# Patient Record
Sex: MALE | Race: WHITE | Employment: OTHER | URBAN - METROPOLITAN AREA
[De-identification: names, ages, dates, MRNs, and addresses within clinical notes are randomized per-mention and may not be internally consistent; named-entity substitution may affect disease eponyms.]

---

## 2019-03-07 ENCOUNTER — TELEPHONE (OUTPATIENT)
Dept: FAMILY MEDICINE | Facility: CLINIC | Age: 54
End: 2019-03-07

## 2019-03-07 ENCOUNTER — OFFICE VISIT (OUTPATIENT)
Dept: FAMILY MEDICINE | Facility: CLINIC | Age: 54
End: 2019-03-07
Payer: MEDICAID

## 2019-03-07 VITALS — SYSTOLIC BLOOD PRESSURE: 158 MMHG | DIASTOLIC BLOOD PRESSURE: 100 MMHG | WEIGHT: 237 LBS

## 2019-03-07 DIAGNOSIS — S46.211S TEAR OF RIGHT BICEPS MUSCLE, SEQUELA: ICD-10-CM

## 2019-03-07 DIAGNOSIS — Z79.4 TYPE 2 DIABETES MELLITUS WITH HYPERGLYCEMIA, WITH LONG-TERM CURRENT USE OF INSULIN (H): Primary | ICD-10-CM

## 2019-03-07 DIAGNOSIS — E66.811 CLASS 1 OBESITY WITH SERIOUS COMORBIDITY IN ADULT, UNSPECIFIED BMI, UNSPECIFIED OBESITY TYPE: ICD-10-CM

## 2019-03-07 DIAGNOSIS — E11.65 TYPE 2 DIABETES MELLITUS WITH HYPERGLYCEMIA, WITH LONG-TERM CURRENT USE OF INSULIN (H): Primary | ICD-10-CM

## 2019-03-07 DIAGNOSIS — E78.5 DYSLIPIDEMIA: ICD-10-CM

## 2019-03-07 DIAGNOSIS — I10 ESSENTIAL HYPERTENSION: ICD-10-CM

## 2019-03-07 DIAGNOSIS — R80.9 POSITIVE FOR MICROALBUMINURIA: ICD-10-CM

## 2019-03-07 PROBLEM — E11.9 DIABETES MELLITUS, TYPE 2 (H): Status: ACTIVE | Noted: 2019-03-07

## 2019-03-07 LAB
CHOLEST SERPL-MCNC: 244 MG/DL
CREAT SERPL-MCNC: 0.77 MG/DL (ref 0.66–1.25)
CREAT UR-MCNC: 167 MG/DL
GFR SERPL CREATININE-BSD FRML MDRD: >90 ML/MIN/{1.73_M2}
HBA1C MFR BLD: 7.9 % (ref 0–5.6)
HDLC SERPL-MCNC: 48 MG/DL
LDLC SERPL CALC-MCNC: 178 MG/DL
MICROALBUMIN UR-MCNC: 119 MG/L
MICROALBUMIN/CREAT UR: 71.26 MG/G CR (ref 0–17)
NONHDLC SERPL-MCNC: 196 MG/DL
TRIGL SERPL-MCNC: 91 MG/DL
TSH SERPL DL<=0.005 MIU/L-ACNC: 1.91 MU/L (ref 0.4–4)

## 2019-03-07 PROCEDURE — 99203 OFFICE O/P NEW LOW 30 MIN: CPT | Performed by: PHYSICIAN ASSISTANT

## 2019-03-07 PROCEDURE — 83036 HEMOGLOBIN GLYCOSYLATED A1C: CPT | Performed by: PHYSICIAN ASSISTANT

## 2019-03-07 PROCEDURE — 82565 ASSAY OF CREATININE: CPT | Performed by: PHYSICIAN ASSISTANT

## 2019-03-07 PROCEDURE — 80061 LIPID PANEL: CPT | Performed by: PHYSICIAN ASSISTANT

## 2019-03-07 PROCEDURE — 99207 C FOOT EXAM  NO CHARGE: CPT | Performed by: PHYSICIAN ASSISTANT

## 2019-03-07 PROCEDURE — 82043 UR ALBUMIN QUANTITATIVE: CPT | Performed by: PHYSICIAN ASSISTANT

## 2019-03-07 PROCEDURE — 84443 ASSAY THYROID STIM HORMONE: CPT | Performed by: PHYSICIAN ASSISTANT

## 2019-03-07 PROCEDURE — 36415 COLL VENOUS BLD VENIPUNCTURE: CPT | Performed by: PHYSICIAN ASSISTANT

## 2019-03-07 RX ORDER — AMLODIPINE BESYLATE 5 MG/1
5 TABLET ORAL DAILY
Qty: 30 TABLET | Refills: 0 | Status: SHIPPED | OUTPATIENT
Start: 2019-03-07 | End: 2019-09-30

## 2019-03-07 RX ORDER — ATORVASTATIN CALCIUM 20 MG/1
20 TABLET, FILM COATED ORAL DAILY
Qty: 30 TABLET | Refills: 11 | Status: SHIPPED | OUTPATIENT
Start: 2019-03-07

## 2019-03-07 RX ORDER — LANCETS
EACH MISCELLANEOUS
Qty: 100 EACH | Refills: 11 | Status: SHIPPED | OUTPATIENT
Start: 2019-03-07

## 2019-03-07 RX ORDER — GLUCOSAMINE HCL/CHONDROITIN SU 500-400 MG
CAPSULE ORAL
Qty: 100 EACH | Refills: 3 | Status: SHIPPED | OUTPATIENT
Start: 2019-03-07

## 2019-03-07 SDOH — HEALTH STABILITY: MENTAL HEALTH: HOW OFTEN DO YOU HAVE A DRINK CONTAINING ALCOHOL?: NEVER

## 2019-03-07 NOTE — LETTER
March 8, 2019      Prieto Guzmán  [address] [place] [address]        Dear ,    We are writing to inform you of your test results.    Repeat MAC in 3 mo.    Resulted Orders   Hemoglobin A1c   Result Value Ref Range    Hemoglobin A1C 7.9 (H) 0 - 5.6 %      Comment:      Normal <5.7% Prediabetes 5.7-6.4%  Diabetes 6.5% or higher - adopted from ADA   consensus guidelines.     Lipid panel reflex to direct LDL Non-fasting   Result Value Ref Range    Cholesterol 244 (H) <200 mg/dL      Comment:      Desirable:       <200 mg/dl    Triglycerides 91 <150 mg/dL      Comment:      Non Fasting    HDL Cholesterol 48 >39 mg/dL    LDL Cholesterol Calculated 178 (H) <100 mg/dL      Comment:      Above desirable:  100-129 mg/dl  Borderline High:  130-159 mg/dL  High:             160-189 mg/dL  Very high:       >189 mg/dl      Non HDL Cholesterol 196 (H) <130 mg/dL      Comment:      Above Desirable:  130-159 mg/dl  Borderline high:  160-189 mg/dl  High:             190-219 mg/dl  Very high:       >219 mg/dl     Creatinine   Result Value Ref Range    Creatinine 0.77 0.66 - 1.25 mg/dL    GFR Estimate >90 >60 mL/min/[1.73_m2]      Comment:      Non  GFR Calc  Starting 12/18/2018, serum creatinine based estimated GFR (eGFR) will be   calculated using the Chronic Kidney Disease Epidemiology Collaboration   (CKD-EPI) equation.      GFR Estimate If Black >90 >60 mL/min/[1.73_m2]      Comment:       GFR Calc  Starting 12/18/2018, serum creatinine based estimated GFR (eGFR) will be   calculated using the Chronic Kidney Disease Epidemiology Collaboration   (CKD-EPI) equation.     TSH with free T4 reflex   Result Value Ref Range    TSH 1.91 0.40 - 4.00 mU/L   Albumin Random Urine Quantitative with Creat Ratio   Result Value Ref Range    Creatinine Urine 167 mg/dL    Albumin Urine mg/L 119 mg/L    Albumin Urine mg/g Cr 71.26 (H) 0 - 17 mg/g Cr       If you have any questions or  concerns, please call the clinic at the number listed above.       Sincerely,        Izzy Gao PA-C

## 2019-03-07 NOTE — PATIENT INSTRUCTIONS
Diabetes -  a1c 7.9 - ideally target under 7 since newer diagnosis   Slight increase levemir  Do set up diabetic educator appt   Avoid alcohol until sugars are fully controlled  Targets: morning before eating   2 hrs after eating - under 180   Don't want ever under 70  Eventually eye doctor     BP -  Amlodipine 5 mg  If finding BPs tend to be at/above 130/80, can increase to amlodipine 10 mg   Can call me with updates for further med adjustments     Cough - make another appt     Disability hunting permit

## 2019-03-07 NOTE — NURSING NOTE
Chief Complaint   Patient presents with     Diabetes     Hypertension       Initial BP (!) 168/102 (BP Location: Right arm, Patient Position: Chair, Cuff Size: Adult Large)   Wt 107.5 kg (237 lb)  There is no height or weight on file to calculate BMI.    Patient presents to the clinic using No DME    Health Maintenance that is potentially due pending provider review:  NONE        Is there anyone who you would like to be able to receive your results? No  If yes have patient fill out RAYSA

## 2019-03-07 NOTE — TELEPHONE ENCOUNTER
Unable to leave a message at this time.  I did talk with him earlier and he said he would call me back  Need this from him when he calls back:      insulin isophane human (HUMULIN N PEN) 100 UNIT/ML injection 3 mL 1 3/7/2019  --   Sig: With sliding scale.     Need to know the specific sliding scale details  Cheryl Luke RN
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
## 2019-03-08 ENCOUNTER — TELEPHONE (OUTPATIENT)
Dept: FAMILY MEDICINE | Facility: CLINIC | Age: 54
End: 2019-03-08

## 2019-03-08 ENCOUNTER — OFFICE VISIT (OUTPATIENT)
Dept: FAMILY MEDICINE | Facility: CLINIC | Age: 54
End: 2019-03-08
Payer: MEDICAID

## 2019-03-08 ENCOUNTER — PATIENT OUTREACH (OUTPATIENT)
Dept: CARE COORDINATION | Facility: CLINIC | Age: 54
End: 2019-03-08

## 2019-03-08 VITALS
WEIGHT: 242.2 LBS | OXYGEN SATURATION: 97 % | BODY MASS INDEX: 33.91 KG/M2 | HEART RATE: 81 BPM | DIASTOLIC BLOOD PRESSURE: 80 MMHG | SYSTOLIC BLOOD PRESSURE: 126 MMHG | RESPIRATION RATE: 16 BRPM | TEMPERATURE: 98.1 F | HEIGHT: 71 IN

## 2019-03-08 DIAGNOSIS — I10 ESSENTIAL HYPERTENSION: ICD-10-CM

## 2019-03-08 DIAGNOSIS — R05.8 PRODUCTIVE COUGH: ICD-10-CM

## 2019-03-08 DIAGNOSIS — R80.9 MICROALBUMINURIA: ICD-10-CM

## 2019-03-08 DIAGNOSIS — Z79.4 TYPE 2 DIABETES MELLITUS WITH HYPERGLYCEMIA, WITH LONG-TERM CURRENT USE OF INSULIN (H): ICD-10-CM

## 2019-03-08 DIAGNOSIS — E11.65 TYPE 2 DIABETES MELLITUS WITH HYPERGLYCEMIA, WITH LONG-TERM CURRENT USE OF INSULIN (H): ICD-10-CM

## 2019-03-08 DIAGNOSIS — J01.90 ACUTE SINUSITIS WITH SYMPTOMS > 10 DAYS: Primary | ICD-10-CM

## 2019-03-08 PROCEDURE — 99213 OFFICE O/P EST LOW 20 MIN: CPT | Performed by: PHYSICIAN ASSISTANT

## 2019-03-08 RX ORDER — AMOXICILLIN 875 MG
875 TABLET ORAL 2 TIMES DAILY
Qty: 20 TABLET | Refills: 0 | Status: SHIPPED | OUTPATIENT
Start: 2019-03-08 | End: 2019-03-18

## 2019-03-08 ASSESSMENT — MIFFLIN-ST. JEOR: SCORE: 1960.74
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
## 2019-03-08 NOTE — LETTER
[facility] CARE COORDINATION - [place] [id number] [place] [address]  [phone]    March 11, 2019    Prieto Guzmán  [address] [place] [address]      Dear Prieto,    I am a clinic care coordinator who works at [facility]. I have been trying to reach you recently to introduce Clinic Care Coordination and to see if there was anything I could assist you with.  I wanted to introduce myself and provide you with my contact information so that you can call me with questions or concerns about your health care. Below is a description of clinic care coordination and how I can further assist you.     The clinic care coordinator is a registered nurse and/or  who understand the health care system. The goal of clinic care coordination is to help you manage your health and improve access to the [facility] system in the most efficient manner. The registered nurse can assist you in meeting your health care goals by providing education, coordinating services, and strengthening the communication among your providers. The  can assist you with financial, behavioral, psychosocial, chemical dependency, counseling, and/or psychiatric resources.    Please feel free to contact me at [phone], with any questions or concerns. We at [facility] are focused on providing you with the highest-quality healthcare experience possible and that all starts with you.     Sincerely,     Aminata Caputo, [facility]  Clinic Care Coordination  [phone]      Enclosed: I have enclosed a copy of a 24 Hour Access Plan. This has helpful phone numbers for you to call when needed. Please keep this in an easy to access place to use as needed.

## 2019-03-08 NOTE — PROGRESS NOTES
Clinic Care Coordination Contact  [facility]/Voicemail    Referral Source: PCP  Clinical Data: Care Coordinator Outreach  Outreach attempted x 1, no voicemail box set up and unable to leave a message.    Plan: Care Coordinator will wait to send letter address in the system is for [facility] in the patient appears to be visiting closer to [place]. Care Coordinator will try to reach patient again in 1-2 business days.  TOPHER Garcia, Clinic Care Coordinator 3/8/2019   12:22 PM  [phone]

## 2019-03-08 NOTE — TELEPHONE ENCOUNTER
When discussing what insulin the patinet is using, he said he is on the Humalin R not N and he is using it twice daily.  Since he does not have insurance there is a novolin R that we can dispense for $25.     Can we get a new order?    Dru Emmanuel, Pharm D  [facility]  [phone]

## 2019-03-08 NOTE — PATIENT INSTRUCTIONS
Decided didn't want augmentin or doxycycline, so treating with amoxicillin    Be seen if not resolving within a week after the antibiotics     congrats on BP    Follow up 3 months for diabetes     Call if questions

## 2019-03-08 NOTE — LETTER
Health Care Home - Access Care Plan    About Me  Patient Name:  Prieto Casillas    YOB: 1965  Age:                             53 year old   Kayleen MRN:            [id number] Telephone Information:   Home Phone [phone]   Mobile Not on file.       Address:    [address] [place] [address] Email address:  No e-mail address on record      Emergency Contact(s)  Name Relationship Lgl Grd Work Phone Home Phone Mobile Phone   1Tereza CASILLAS,JU* Son   [phone]              Health Maintenance:      My Access Plan  Medical Emergency 911   Questions or concerns during clinic hours Primary Clinic Line, I will call the clinic directly: [facility] - [phone]   24 Hour Appointment Line [phone] or  [phone]) (toll free)   24 Hour Nurse Line [phone] (toll free)   Questions or concerns outside clinic hours 24 Hour Appointment Line, I will call the after-hours on-call line:   [facility] [phone] or [phone]) (toll-free)   Preferred Urgent Care     Preferred Hospital     Preferred Pharmacy No Pharmacies Listed   Behavioral Health Crisis Line The National Suicide Prevention Lifeline at 1-362.238.7507 or 914     My Care Team Members  Patient Care Team       Relationship Specialty Notifications Start End    No Ref-Primary, Physician PCP - General   3/7/19     Fax: [phone]         Aminata Caputo LSW Clinic Care Coordinator Primary Care - CC Admissions 3/8/19     Phone: [phone] Fax: [phone]               My Medical and Care Information  Problem List   Patient Active Problem List   Diagnosis     Diabetes mellitus, type 2 (H)     Tear of right biceps muscle, sequela     Positive for microalbuminuria     Essential hypertension      Current Medications and Allergies:  See printed Medication Report

## 2019-03-11 NOTE — PROGRESS NOTES
Clinic Care Coordination Contact  [facility]/Voicemail    Referral Source: PCP  Clinical Data: Care Coordinator Outreach  Outreach attempted x 2, no voice mail box.    Plan: Care Coordinator will mail out care coordination introduction letter with care coordinator contact information and explanation of care coordination services. Care Coordinator will do no further outreaches at this time.  TOPHER Garcia, Clinic Care Coordinator 3/11/2019   9:43 AM  [phone]

## 2019-03-19 ENCOUNTER — TELEPHONE (OUTPATIENT)
Dept: FAMILY MEDICINE | Facility: CLINIC | Age: 54
End: 2019-03-19

## 2019-03-19 NOTE — TELEPHONE ENCOUNTER
S-(situation): states was sitting and checking blood sugar and found himself on the floor.  Thinks on the floor for 5 minutes.   Got up, did not injure self.  Feels fine now.  Blood sugars in the 200's    B-(background): diabetic. On amoxicillin for sinus infection.  Seen on 3-8-19 for nasal congestion and facial pain and cough.  States is coughing and has lots of nasal drainage. No fever    A-(assessment): patient reporting that he passed out for a brief time this AM, cause unknown.    R-(recommendations): he has appointment for tomorrow  .  Cheryl Luke RN

## 2019-03-19 NOTE — TELEPHONE ENCOUNTER
Reason for Call:  Other     Detailed comments: Patient passed out and found himself on the floor this morning at 7:00.  His Blood sugar is 200.  He doesn't know why he passed out - Coughing a lot. He has gotten worse than better from 03/08/19.      Phone Number Patient can be reached at: Home number on file [phone] (home)    Best Time:     Can we leave a detailed message on this number? YES    Call taken on 3/19/2019 at 12:27 PM by Yessica Campuzano

## 2019-03-21 ENCOUNTER — OFFICE VISIT (OUTPATIENT)
Dept: FAMILY MEDICINE | Facility: CLINIC | Age: 54
End: 2019-03-21
Payer: MEDICAID

## 2019-03-21 VITALS
SYSTOLIC BLOOD PRESSURE: 138 MMHG | WEIGHT: 243 LBS | HEART RATE: 76 BPM | BODY MASS INDEX: 33.89 KG/M2 | TEMPERATURE: 97.8 F | DIASTOLIC BLOOD PRESSURE: 86 MMHG | RESPIRATION RATE: 18 BRPM

## 2019-03-21 DIAGNOSIS — F43.22 ADJUSTMENT DISORDER WITH ANXIOUS MOOD: Primary | ICD-10-CM

## 2019-03-21 PROCEDURE — 99214 OFFICE O/P EST MOD 30 MIN: CPT | Performed by: FAMILY MEDICINE

## 2019-03-21 RX ORDER — LORAZEPAM 1 MG/1
1 TABLET ORAL EVERY 8 HOURS PRN
Qty: 30 TABLET | Refills: 0 | Status: SHIPPED | OUTPATIENT
Start: 2019-03-21 | End: 2019-09-30

## 2019-03-21 NOTE — NURSING NOTE
"Chief Complaint   Patient presents with     Cough       Initial /86 (BP Location: Right arm, Patient Position: Chair, Cuff Size: Adult Large)   Pulse 76   Temp 97.8  F (36.6  C) (Tympanic)   Resp 18   Wt 110.2 kg (243 lb)   BMI 33.89 kg/m   Estimated body mass index is 33.89 kg/m  as calculated from the following:    Height as of 3/8/19: 1.803 m (5' 11\").    Weight as of this encounter: 110.2 kg (243 lb).    Patient presents to the clinic using No DME    Health Maintenance that is potentially due pending provider review:  farzad Rosenberg MA  2:25 PM 3/21/2019  .      "
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
## 2019-03-27 ENCOUNTER — PATIENT OUTREACH (OUTPATIENT)
Dept: CARE COORDINATION | Facility: CLINIC | Age: 54
End: 2019-03-27

## 2019-03-27 NOTE — LETTER
Health Care Home - Access Care Plan    About Me  Patient Name:  Prieto Casillas    YOB: 1965  Age:                             54 year old   Kayleen MRN:            [id number] Telephone Information:   Home Phone [phone]   Mobile Not on file.       Address:    [address] [address] Email address:  No e-mail address on record      Emergency Contact(s)  Name Relationship Lgl Grd Work Phone Home Phone Mobile Phone   Arie CASILLAS,JU* Son   [phone]              Health Maintenance:      My Access Plan  Medical Emergency 912   Questions or concerns during clinic hours Primary Clinic Line, I will call the clinic directly: [facility] - [phone]   24 Hour Appointment Line [phone] or  [phone] [phone]) (toll free)   24 Hour Nurse Line [phone] (toll free)   Questions or concerns outside clinic hours 24 Hour Appointment Line, I will call the after-hours on-call line:   [facility] [phone] or [phone]) (toll-free)   Preferred Urgent Care     Preferred Hospital     Preferred Pharmacy No Pharmacies Listed   Behavioral Health Crisis Line The National Suicide Prevention Lifeline at 1-819.905.2434 or 91     My Care Team Members  Patient Care Team       Relationship Specialty Notifications Start End    No Ref-Primary, Physician PCP - General   3/7/19     Fax: [phone]         Izzy Gao PAVasileC Assigned PCP   2/10/19     Phone: [phone] Fax: [phone] [address] [place] [address]    Aminata Caputo LSW Clinic Care Coordinator Primary Care - CC Admissions 3/8/19     Phone: [phone] Fax: [phone]               My Medical and Care Information  Problem List   Patient Active Problem List   Diagnosis     Diabetes mellitus, type 2 (H)     Tear of right biceps muscle, sequela     Positive for microalbuminuria     Essential hypertension      Current Medications and  Allergies:  See printed Medication Report

## 2019-03-27 NOTE — PROGRESS NOTES
Clinic Care Coordination Contact  Outreach    Received updated address his last letter was returned. Attempted to call patient in no voicemail set up.  Will recent introduction letter and access plan and call in about 2 weeks.    TOPHER Garcia, Clinic Care Coordinator 3/27/2019   3:04 PM  [phone]

## 2019-04-15 NOTE — PROGRESS NOTES
Clinic Care Coordination Contact    Clinic Care Coordination Contact  OUTREACH    Referral Information:  Referral Source: PCP    Primary Diagnosis: Psychosocial    Chief Complaint   Patient presents with     Clinic Care Coordination - Initial             Universal Utilization: Patient plans to move back to [facility].  Patient has only been in [place] since 3/7/2019.  Clinic Utilization  Difficulty keeping appointments:: No  Compliance Concerns: No  No-Show Concerns: No  No PCP office visit in Past Year: No  Utilization    Last refreshed: 3/28/2019  7:35 AM:  Hospital Admissions 0           Last refreshed: 3/28/2019  7:35 AM:  ED Visits 0           Last refreshed: 3/28/2019  7:35 AM:  No Show Count (past year) 0              Current as of: 3/28/2019  7:35 AM              Clinical Concerns:  Current Medical Concerns: Not discussed this patient's only concern was getting insurance.  Current Behavioral Concerns: Patient was recently in to see provider with adjustment disorder and given Ativan for short-term use.  Education Provided to patient: Patient will follow up once he gets reestablished after his move.     Health Maintenance Reviewed: Due/Overdue   Health Maintenance Due   Topic Date Due     PREVENTIVE CARE VISIT  1965     EYE EXAM Q1 YEAR  03/20/1965     HIV SCREEN (SYSTEM ASSIGNED)  03/20/1982     HEPATITIS C SCREENING  03/20/1982     DTAP/TDAP/TD IMMUNIZATION (1 - Tdap) 03/20/1989     COLON CANCER SCREEN (SYSTEM ASSIGNED)  03/20/2014     ZOSTER IMMUNIZATION (1 of 2) 03/20/2014     INFLUENZA VACCINE (1) 09/01/2018     ADVANCE DIRECTIVE PLANNING Q5 YRS  03/20/2019       Clinical Pathway: None    Medication Management:  Not discussed.  Patient was only focused on getting insurance.    Functional Status:       Living Situation:  Current living arrangement:: Other  Type of residence:: Homeless(staying with friends as he looks for housing)    Diet/Exercise/Sleep:       Transportation:  Transportation  concerns (GOAL):: No  Transportation means:: Regular car     Psychosocial:  Taoist or spiritual beliefs that impact treatment:: No  Mental health DX:: No  Mental health management concern (GOAL):: No     Financial/Insurance:   Financial/Insurance concerns (GOAL):: Yes  Patient does not currently have insurance.  He previously had [place] care.  His plan is to move back to [facility] within the next month and will apply through that [facility].  Discussed calling the [facility] to get the process going for insurance.     Resources and Interventions:  Current Resources:    ;   Community Resources: None          Advance Care Plan/Directive  Advanced Care Plans/Directives on file:: No    Referrals Placed: [facility]     Goals:         Patient/Caregiver understanding: n/a           Plan: Patient to work on getting insurance.  Patient to call  if he has any further questions or concerns.  At this time no calls as patient denied future calls at this time.    TOPHER Garcia, [facility] - Care Coordinator   [facility]  4/15/2019   9:52 AM  [phone]

## 2019-08-16 ENCOUNTER — TELEPHONE (OUTPATIENT)
Dept: FAMILY MEDICINE | Facility: CLINIC | Age: 54
End: 2019-08-16

## 2019-08-16 NOTE — TELEPHONE ENCOUNTER
Patient was told to return for a microalbumin urine, reminder letter was sent to patient on 07/10/2019, patient has still not scheduled an appointment.

## 2019-08-16 NOTE — TELEPHONE ENCOUNTER
Call attempted, unable to leave voicemail.  Due for diabetic appointment and fasting labs in September.

## 2019-08-20 NOTE — TELEPHONE ENCOUNTER
His girlfriend called. She says Prieto asked her to call for him because he is currently in treatment and will be there until the middle of September. Advised that he make apt when he is out.

## 2019-09-25 ENCOUNTER — TELEPHONE (OUTPATIENT)
Dept: BEHAVIORAL HEALTH | Facility: CLINIC | Age: 54
End: 2019-09-25

## 2019-09-25 NOTE — TELEPHONE ENCOUNTER
Pt called regarding CD IOP.  Currently not insured.  May consider self-pay but did not want to speak with business office at this time.  Will explore MNsure options and Rule 25 and call back.

## 2019-09-26 NOTE — TELEPHONE ENCOUNTER
Rcvd call from Madelyn @ [facility] [phone]  She will send rule 25 with CPA   Once rcvd client can amanda face to face.

## 2019-09-26 NOTE — TELEPHONE ENCOUNTER
9/26/19 Received call from Lisbet Gardner ([facility] [phone]) client is looking to get into CD OP at [place]. [facility] CPA to be faxed to Intake. MEGHANN

## 2019-09-27 NOTE — TELEPHONE ENCOUNTER
Attempted calling pt, no answer - Called and left vm with referring Edna Hankins [phone].    Received R25, clinical and signed CPA. Need RAYSA & to schedule face to face with patient. Referral is for CD IOP [place]. Pt has CCDTF.. .    Referral created, clinical at [facility] desk. [facility]    Update received RAYSA (in middle of initial fax) , faxed to [place], filed in cabinet and routed to Charleen. [facility]

## 2019-09-30 ENCOUNTER — OFFICE VISIT (OUTPATIENT)
Dept: FAMILY MEDICINE | Facility: CLINIC | Age: 54
End: 2019-09-30
Payer: MEDICAID

## 2019-09-30 VITALS
TEMPERATURE: 96.7 F | WEIGHT: 257 LBS | SYSTOLIC BLOOD PRESSURE: 150 MMHG | HEIGHT: 71 IN | BODY MASS INDEX: 35.98 KG/M2 | HEART RATE: 64 BPM | DIASTOLIC BLOOD PRESSURE: 80 MMHG | RESPIRATION RATE: 30 BRPM

## 2019-09-30 DIAGNOSIS — R20.2 HAND PARESTHESIA, UNSPECIFIED LATERALITY: ICD-10-CM

## 2019-09-30 DIAGNOSIS — E11.65 TYPE 2 DIABETES MELLITUS WITH HYPERGLYCEMIA, WITH LONG-TERM CURRENT USE OF INSULIN (H): Primary | ICD-10-CM

## 2019-09-30 DIAGNOSIS — F43.9 STRESS: ICD-10-CM

## 2019-09-30 DIAGNOSIS — G47.00 INSOMNIA, UNSPECIFIED TYPE: ICD-10-CM

## 2019-09-30 DIAGNOSIS — Z12.11 SPECIAL SCREENING FOR MALIGNANT NEOPLASMS, COLON: ICD-10-CM

## 2019-09-30 DIAGNOSIS — E66.01 MORBID OBESITY (H): ICD-10-CM

## 2019-09-30 DIAGNOSIS — I10 ESSENTIAL HYPERTENSION: ICD-10-CM

## 2019-09-30 DIAGNOSIS — Z79.4 TYPE 2 DIABETES MELLITUS WITH HYPERGLYCEMIA, WITH LONG-TERM CURRENT USE OF INSULIN (H): Primary | ICD-10-CM

## 2019-09-30 LAB
ANION GAP SERPL CALCULATED.3IONS-SCNC: 5 MMOL/L (ref 3–14)
BUN SERPL-MCNC: 23 MG/DL (ref 7–30)
CALCIUM SERPL-MCNC: 8.7 MG/DL (ref 8.5–10.1)
CHLORIDE SERPL-SCNC: 101 MMOL/L (ref 94–109)
CO2 SERPL-SCNC: 28 MMOL/L (ref 20–32)
CREAT SERPL-MCNC: 0.74 MG/DL (ref 0.66–1.25)
GFR SERPL CREATININE-BSD FRML MDRD: >90 ML/MIN/{1.73_M2}
GLUCOSE SERPL-MCNC: 201 MG/DL (ref 70–99)
HBA1C MFR BLD: 7.2 % (ref 0–5.6)
POTASSIUM SERPL-SCNC: 4.1 MMOL/L (ref 3.4–5.3)
SODIUM SERPL-SCNC: 134 MMOL/L (ref 133–144)

## 2019-09-30 PROCEDURE — 83036 HEMOGLOBIN GLYCOSYLATED A1C: CPT | Performed by: PHYSICIAN ASSISTANT

## 2019-09-30 PROCEDURE — 90732 PPSV23 VACC 2 YRS+ SUBQ/IM: CPT | Performed by: PHYSICIAN ASSISTANT

## 2019-09-30 PROCEDURE — 90471 IMMUNIZATION ADMIN: CPT | Performed by: PHYSICIAN ASSISTANT

## 2019-09-30 PROCEDURE — 80048 BASIC METABOLIC PNL TOTAL CA: CPT | Performed by: PHYSICIAN ASSISTANT

## 2019-09-30 PROCEDURE — 99214 OFFICE O/P EST MOD 30 MIN: CPT | Mod: 25 | Performed by: PHYSICIAN ASSISTANT

## 2019-09-30 PROCEDURE — 36415 COLL VENOUS BLD VENIPUNCTURE: CPT | Performed by: PHYSICIAN ASSISTANT

## 2019-09-30 RX ORDER — AMLODIPINE BESYLATE 10 MG/1
10 TABLET ORAL DAILY
Qty: 30 TABLET | Refills: 0 | Status: SHIPPED | OUTPATIENT
Start: 2019-09-30

## 2019-09-30 ASSESSMENT — MIFFLIN-ST. JEOR: SCORE: 2022.87

## 2019-09-30 NOTE — LETTER
October 1, 2019      Prieto Guzmán  [address] [address]        Dear ,    We are writing to inform you of your test results.    Your test results fall within the expected range(s) or remain unchanged from previous results.  Please continue with current treatment plan.    Resulted Orders   Basic metabolic panel  (Ca, Cl, CO2, Creat, Gluc, K, Na, BUN)   Result Value Ref Range    Sodium 134 133 - 144 mmol/L    Potassium 4.1 3.4 - 5.3 mmol/L    Chloride 101 94 - 109 mmol/L    Carbon Dioxide 28 20 - 32 mmol/L    Anion Gap 5 3 - 14 mmol/L    Glucose 201 (H) 70 - 99 mg/dL      Comment:      Non Fasting    Urea Nitrogen 23 7 - 30 mg/dL    Creatinine 0.74 0.66 - 1.25 mg/dL    GFR Estimate >90 >60 mL/min/[1.73_m2]      Comment:      Non  GFR Calc  Starting 12/18/2018, serum creatinine based estimated GFR (eGFR) will be   calculated using the Chronic Kidney Disease Epidemiology Collaboration   (CKD-EPI) equation.      GFR Estimate If Black >90 >60 mL/min/[1.73_m2]      Comment:       GFR Calc  Starting 12/18/2018, serum creatinine based estimated GFR (eGFR) will be   calculated using the Chronic Kidney Disease Epidemiology Collaboration   (CKD-EPI) equation.      Calcium 8.7 8.5 - 10.1 mg/dL   Hemoglobin A1c   Result Value Ref Range    Hemoglobin A1C 7.2 (H) 0 - 5.6 %      Comment:      Normal <5.7% Prediabetes 5.7-6.4%  Diabetes 6.5% or higher - adopted from ADA   consensus guidelines.         If you have any questions or concerns, please call the clinic at the number listed above.       Sincerely,        Izzy Gao PA-C

## 2019-09-30 NOTE — NURSING NOTE
"Chief Complaint   Patient presents with     Diabetes     Hypertension       Initial BP (!) 150/86   Pulse 64   Temp 96.7  F (35.9  C) (Tympanic)   Resp 30   Ht 1.803 m (5' 11\")   Wt 116.6 kg (257 lb)   BMI 35.84 kg/m   Estimated body mass index is 35.84 kg/m  as calculated from the following:    Height as of this encounter: 1.803 m (5' 11\").    Weight as of this encounter: 116.6 kg (257 lb).    Patient presents to the clinic using No DME    Health Maintenance that is potentially due pending provider review:  Colonoscopy/FIT    Pt declines to have colonoscopy, interested in FIT.    Is there anyone who you would like to be able to receive your results? Yes - treatment center(is within [place])  If yes have patient fill out RAYSA    Tamy Adams CMA([facility])    "

## 2019-09-30 NOTE — PROGRESS NOTES
Subjective     Prieto Guzmán is a 54 year old male who presents to clinic today for the following health issues:    HPI   Diabetes Follow-up      How often are you checking your blood sugar? Not at all, needs a new meter. Has been 2 weeks since checked sugars.    What time of day are you checking your blood sugars (select all that apply)?  Mornings    Have you had any blood sugars above 200?  No    Have you had any blood sugars below 70?  No    What symptoms do you notice when your blood sugar is low?  Dizzy    What concerns do you have today about your diabetes? None     Do you have any of these symptoms? (Select all that apply)  Numbness in feet     Have you had a diabetic eye exam in the last 12 months? Yes- Date of last eye exam: 3/2019    BP Readings from Last 2 Encounters:   09/30/19 (!) 150/80   03/21/19 138/86     Hemoglobin A1C (%)   Date Value   09/30/2019 7.2 (H)   03/07/2019 7.9 (H)     LDL Cholesterol Calculated (mg/dL)   Date Value   03/07/2019 178 (H)       Diabetes Management Resources  Hypertension Follow-up      Do you check your blood pressure regularly outside of the clinic? No     Are you following a low salt diet? No    Are your blood pressures ever more than 140 on the top number (systolic) OR more   than 90 on the bottom number (diastolic), for example 140/90? Yes      How many servings of fruits and vegetables do you eat daily?  2-3    On average, how many sweetened beverages do you drink each day (soda, juice, sweet tea, etc)?   3  How many days per week do you miss taking your medication? 4    What makes it hard for you to take your medications?  Availability, and timing.    Since last visit -  Was in nursing home again.  Has court again this week.  Also just finished in patient treatment for alcohol last Thursday.  Starting outpatient in [place].  But he is clear no alcohol since 6/17, but that treatment is court ordered.    DM2 - has been doing really well.  Sugars running 130s-140s  "in morning, not checking other times of day.  Has not been on insulin since May as he felt sugars were doing so well.    HTN- has at times been really high - 150s/100.  No chest pains, chest pressures, SOB or sudden change of endurance.    Occasional palpitation, unchanged over past few yrs.  Lasts less than 1 min.      R hand tingling.  Some weakness and dropping things.  Not worsening.  He notes was since hand cuffs in April.  He did have preexisting bicep injury.  Fingers 2-5.  No arm or neck pain.      Tired - Sleeping 4-5 hrs per night then awake with brain racing.  Nap 15 min in day.  Also feels his \"clock\" got screwed up with job in past.  \"I've got to pace myself on the caffeine.\"  Discussed caffeine's possible impact on BP and anxiety as well.    [place] notes he'll be down in this area a fair amount and now plans to continue care here.  He believes he does have insurance in place.    BP Readings from Last 3 Encounters:   09/30/19 (!) 150/80   03/21/19 138/86   03/08/19 126/80    Wt Readings from Last 3 Encounters:   09/30/19 116.6 kg (257 lb)   03/21/19 110.2 kg (243 lb)   03/08/19 109.9 kg (242 lb 3.2 oz)         Reviewed and updated as needed this visit by Provider  Tobacco  Allergies  Meds  Problems  Med Hx  Surg Hx  Fam Hx         Review of Systems   ROS COMP: Constitutional, cardiovascular, pulmonary, musculoskeletal, neuro, endocrine and psych systems are negative, except as otherwise noted.      Objective    BP (!) 150/80   Pulse 64   Temp 96.7  F (35.9  C) (Tympanic)   Resp 30   Ht 1.803 m (5' 11\")   Wt 116.6 kg (257 lb)   BMI 35.84 kg/m    Body mass index is 35.84 kg/m .  Physical Exam   GENERAL: healthy, alert and no distress  RESP: lungs clear to auscultation - no rales, rhonchi or wheezes  CV: regular rate and rhythm, normal S1 S2, no S3 or S4, no murmur, click or rub, no peripheral edema and peripheral pulses strong  PSYCH: mentation " "appears normal, affect normal/bright  MS: R arm and hand without atrophy, neg Tinel and Phalen, strength testing deferred today     Lab Results   Component Value Date    A1C 7.2 09/30/2019    A1C 7.9 03/07/2019             Assessment & Plan       ICD-10-CM    1. Type 2 diabetes mellitus with hyperglycemia, with long-term current use of insulin (H) E11.65 Hemoglobin A1c    Z79.4 insulin detemir (LEVEMIR VIAL) 100 UNIT/ML vial     insulin regular (HUMULIN R/NOVOLIN R VIAL) 100 UNIT/ML vial   2. Essential hypertension I10 Basic metabolic panel  (Ca, Cl, CO2, Creat, Gluc, K, Na, BUN)     amLODIPine (NORVASC) 10 MG tablet   3. Morbid obesity (H) E66.01    4. Hand paresthesia, unspecified laterality R20.2    5. Insomnia, unspecified type G47.00    6. Stress F43.9    7. Special screening for malignant neoplasms, colon Z12.11 Fecal colorectal cancer screen (FIT)   Discussed and deferred B vitamin labs for his paresthesia, seems known injury and he fully assures me he has stopped drinking months ago    Given his somewhat transient lifestyle he would like insulin prescriptions in case sugars begin to rise again, despite not needing insulin currently.     BMI:   Estimated body mass index is 35.84 kg/m  as calculated from the following:    Height as of this encounter: 1.803 m (5' 11\").    Weight as of this encounter: 116.6 kg (257 lb).   Weight management plan: Discussed healthy diet and exercise guidelines    Patient Instructions   Diabetes-  Sugars overall doing well - a1c today 7.2.  Prefer under 7 but would not add med to make this happen at this time.  Refilled insulin in case start needing it again for rising sugars - but do not need now.  If needed to restart, would start with just the long acting insulin first.  Call if questions.   Test occasionally - few times a week   Targets: morning before eating   2 hrs after eating - under 180   Don't want ever under 70  Eventually eye doctor - have them mail   Pneumonia " "shot   Strongly consider flu shot    BP -  Restart amlodipine at half tab for a few days, but if not staying under 130/80, start the whole tab  Call to update me on numbers within the month for refill/adjust  Goal under 130/80    Hand numbness -  Discussed possible work up   Decided to hold off for now     Sleep -  Seeing if racing thoughts go down as stress improves.  Or start med for stress if needed.  Melatonin OTC 5-10 mg, probably at bedtime can help if \"clock\" is screwed up     Colon cancer screen -  Mail the poop    Double check when you had tetanus shot last - within 10 yrs       Return in about 3 months (around 12/30/2019) for diabetes.    Izzy Gao PA-C  [facility]      "

## 2019-09-30 NOTE — PATIENT INSTRUCTIONS
"Diabetes-  Sugars overall doing well - a1c today 7.2.  Prefer under 7 but would not add med to make this happen at this time.  Refilled insulin in case start needing it again for rising sugars - but do not need now.  If needed to restart, would start with just the long acting insulin first.  Call if questions.   Test occasionally - few times a week   Targets: morning before eating   2 hrs after eating - under 180   Don't want ever under 70  Eventually eye doctor - have them mail   Pneumonia shot   Strongly consider flu shot    BP -  Restart amlodipine at half tab for a few days, but if not staying under 130/80, start the whole tab  Call to update me on numbers within the month for refill/adjust  Goal under 130/80    Hand numbness -  Discussed possible work up   Decided to hold off for now     Sleep -  Seeing if racing thoughts go down as stress improves.  Or start med for stress if needed.  Melatonin OTC 5-10 mg, probably at bedtime can help if \"clock\" is screwed up     Colon cancer screen -  Mail the poop    Double check when you had tetanus shot last - within 10 yrs   "

## 2019-10-10 NOTE — TELEPHONE ENCOUNTER
Pt called and schedule face to face eval at [place] for 10/15 at 11:00am.  CPA received and entered in referral.

## 2019-10-14 NOTE — TELEPHONE ENCOUNTER
SAL received for CF Auth DOS 9/30/19 to 12/31/19, if Pt starts program contact Kate Robison to process.

## 2019-10-15 ENCOUNTER — HOSPITAL ENCOUNTER (OUTPATIENT)
Dept: BEHAVIORAL HEALTH | Facility: CLINIC | Age: 54
Discharge: HOME | End: 2019-10-15
Attending: SOCIAL WORKER | Admitting: SOCIAL WORKER
Payer: MEDICAID

## 2019-10-15 VITALS — WEIGHT: 240 LBS | BODY MASS INDEX: 34.36 KG/M2 | HEIGHT: 70 IN

## 2019-10-15 PROCEDURE — H2035 A/D TX PROGRAM, PER HOUR: HCPCS | Performed by: COUNSELOR

## 2019-10-15 ASSESSMENT — ANXIETY QUESTIONNAIRES
5. BEING SO RESTLESS THAT IT IS HARD TO SIT STILL: SEVERAL DAYS
6. BECOMING EASILY ANNOYED OR IRRITABLE: SEVERAL DAYS
7. FEELING AFRAID AS IF SOMETHING AWFUL MIGHT HAPPEN: NOT AT ALL
IF YOU CHECKED OFF ANY PROBLEMS ON THIS QUESTIONNAIRE, HOW DIFFICULT HAVE THESE PROBLEMS MADE IT FOR YOU TO DO YOUR WORK, TAKE CARE OF THINGS AT HOME, OR GET ALONG WITH OTHER PEOPLE: SOMEWHAT DIFFICULT
1. FEELING NERVOUS, ANXIOUS, OR ON EDGE: SEVERAL DAYS
3. WORRYING TOO MUCH ABOUT DIFFERENT THINGS: NOT AT ALL
2. NOT BEING ABLE TO STOP OR CONTROL WORRYING: NOT AT ALL
GAD7 TOTAL SCORE: 5

## 2019-10-15 ASSESSMENT — MIFFLIN-ST. JEOR: SCORE: 1929.88

## 2019-10-15 ASSESSMENT — PAIN SCALES - GENERAL: PAINLEVEL: NO PAIN (0)

## 2019-10-15 ASSESSMENT — PATIENT HEALTH QUESTIONNAIRE - PHQ9
5. POOR APPETITE OR OVEREATING: MORE THAN HALF THE DAYS
SUM OF ALL RESPONSES TO PHQ QUESTIONS 1-9: 7
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
## 2019-10-16 ASSESSMENT — ANXIETY QUESTIONNAIRES: GAD7 TOTAL SCORE: 5

## 2019-11-19 ENCOUNTER — HOSPITAL ENCOUNTER (OUTPATIENT)
Dept: BEHAVIORAL HEALTH | Facility: CLINIC | Age: 54
End: 2019-11-19
Attending: SOCIAL WORKER
Payer: MEDICAID

## 2019-11-19 ENCOUNTER — BEH TREATMENT PLAN (OUTPATIENT)
Dept: BEHAVIORAL HEALTH | Facility: CLINIC | Age: 54
End: 2019-11-19

## 2019-11-19 PROCEDURE — H2035 A/D TX PROGRAM, PER HOUR: HCPCS

## 2019-11-19 NOTE — PROGRESS NOTES
Outcome of vulnerable Adult Assessment for Outpatients:    1.  Do you have a physical, emotional or mental infirmity or dysfunction?       No    2.  Does this issue impair your ability to provide for your own care without help, including providing yourself with food, shelter, clothing, healthcare or supervision?       No      3.  Because of this issue, I need assistance to protect myself from maltreatment by others.      No    Based on the above information:    This person is not a functional Vulnerable Adult according to [place] Statute 626.5572 subdivision 21.

## 2019-11-19 NOTE — PROGRESS NOTES
[facility]  Adult Chemical Dependency Program  Treatment Plan Requirements    These services are provided by the facility for each patient/client according to the individual's treatment plan:    Individual and group counseling    Education    Transition services    Services to address any co-occurring mental illness    Service coordination    Initial Treatment Plan Goals:  1. Complete all the requirements of Program Orientation.  2. Maintain medication compliance throughout the program.  3. Complete requirements for workshop/skills groups based on identified issues on your problem list.  4. Complete the support group attendance feedback sheet weekly.  5. Work to involve family members in tx process to address family issues from the problem list.  6. Attend and participate in all required groups per individual treatment plan.  7. Focus attention to individualized issues from the treatment plan.  8. Complete all requirements for UA's, alcohol screening tests and other testing.  9. Schedule a physical examination if recommended.    In addition to the above, complete all individual goals as specifically outlines on your treatment plan.    Criteria for discharge:  Patients/Clients will receive a favorable or guarded discharge status following completion of the entire program including Phase I and II or acceptance of recommendations or referrals for a different level of care or aftercare at a different facility. Patients/Clients may also receive an unfavorable status if discharged for ongoing inappropriate behavior        Favorable Discharge - Patients/clients have completed agreed upon treatment goals, understand their diagnosis and appear motivated about the follow-up care.    Guarded Discharge - Patients/clients have demonstrated some understanding of their diagnosis and recovery process, and have completed some of their treatment goals.  This prognosis also includes patients/clients who  "have completed some treatment goals but have not made commitment to community support or follow through with referrals.    Unfavorable Discharge - Patients/clients have not completed agreed upon treatment goals due to their own choice, have limited understanding of their diagnosis, and have shown minimal or inconsistent behavior conducive to recovery.  Those patients/clients discharged due to ongoing behavioral problems will also receive an unfavorable discharge status.  Adult CD Treatment Plan                                Prieto Guzmán   [id number]   1965     54 year old           male    Acute Intoxication/Withdrawal Potential     DIMENSION 1  RISK FACTOR: 0     SUBSTANCE USE DISORDERS:  Alcohol            Date Assigned Source Area of Treatment Focus / Goal / Treatment Strategies    Target  Date Initials Outcome Date Completed   11/19/2019 Self -  Current  Area of Treatment Focus:  Client's last use date was reported as 6/17/19    Goal:  Prieto will develop effective strategies to maintain abstinence and manage PAWS    Treatment Strategies:  (Note: Frequency of all strategies are once unless specifically noted.)    Client will report to staff and group any alcohol or drug use daily when applicable though out treatment.  (daily throughout treatment)    Client will submit to random UA and breathalyzers when requested throughout treatment. (as requested throughout treatment)    Client will read \" Why Addicts/Alcoholics Don't Get Better Immediately: Post-acute Withdrawal Syndrome (PAWS)\" and describe 3 symptoms he has experienced and 2 strategies to manage each symptom                           4/27/20 4/27/20 12/03/19                           CN        CN          CN                           Completed        Completed          Completed                           3/30/2020        3/30/2020          1/22/2020     Biomedical Conditions and Complaints     DIMENSION 2  RISK FACTOR: 0     " "        Date Assigned Source Area of Treatment Focus / Goal / Treatment Strategies Target  Date Initials Outcome Date Completed   11/19/2019 Self -  Current  Area of Treatment Focus:  Client is neglectful of physical health in active use    Goal:  Prieto will improve and maintain healthy self care habits    Treatment Strategies:   (Note: Frequency of all strategies are once unless specifically noted.)    Client will maintain a primary care provider and inform provider of current participation in [facility] outpatient treatment    Client will work to get 6-8 hours of sleep each night and report weekly on Monday check in sheet    Client will engage in physical activity at least 20 minutes a week and report weekly on Monday check in sheet                             4/27/20 4/27/20 4/27/20                                 CN        CN        CN                                       Completed        Completed        Completed                         3/30/2020        3/30/2020        3/30/2020     Emotional/Behavioral/Cognitive Conditions and Complications     DIMENSION 3  RISK FACTOR: 1             Date Assigned Source Area of Treatment Focus / Goal / Treatment Strategies Target  Date Initials Outcome Date Completed   11/19/2019 Self - Current Area of Treatment Focus:  Client meets criteria for Generalized Anxiety Disorder.    Goal:   Client will reduce his SADIQ-7 score from a 14 to a 7.      Treatment Strategies:  Client will read, \"Anxiety & Worry\" REBT pamphlet and share insights in therapy.     Client will then use this gained understanding to complete the REBT \"Anxiety & Worry workbook and share insights in therapy.     Client will complete three to five pages at a time out of the anxiety packet and share in therapy. We will discuss this ongoing throughout several sessions until completed.                     1/6/2020 1/20/20 2/19/2020                     JOHN LOPEZ" "                    Completed      Completed      Not Completed                     1/22/2020 2/17/2020       Readiness to Change     DIMENSION 4  RISK FACTOR: 2             Date Assigned Source Area of Treatment Focus / Goal / Treatment Strategies Target  Date Initials Outcome Date Completed   11/19/2019 Self -  Current  Area of Treatment Focus:  Client is in need of the support of an outpatient program    Goal  Prieto will be able to verbalize the importance of support from this outpatient program    Treatment Strategies:  (Note: Frequency of all strategies are once unless specifically noted.)    Client will attend three 3 hour sessions of group a week and individual sessions as needed throughout the course of Phase I outpatient treatment.    Client will attend two 3 hour sessions of group a week and individual sessions as needed throughout the course of Phase II outpatient treatment.    Client will attend one 2 hour session of group a week and individual sessions as needed throughout the course of Phase III outpatient treatment.    Client will complete \"Understanding Addiction\" assignment and share key points in group                           1/15/20          3/7/20          4/27/20          12/05/19                           CN          CN          CN          CN                           Completed          Revised          Revised          Completed                           2/26/2020          3/30/2020          3/30/2020          12/05/2019     Date Assigned Source Area of Treatment Focus / Goal / Treatment Strategies Target  Date Initials Outcome Date Completed   11/19/2019 Self -  Current  Area of Treatment Focus:  Development of skills needed to maintain long term motivation for recovery.    Goal:  Prieto will develop intrinsic motivation to maintain recovery efforts     Treatment Strategies:  (Note: Frequency of all strategies are once unless specifically noted.)      Client will create a " "\"Declo Statement\" and share with group by date indicated                             1/15/20                           CN                           Completed                           2/5/2020       Relapse/Continues Use/Continues Problem Potential     DIMENSION 5  RISK FACTOR: 3                 Date Assigned Source Area of Treatment Focus / Goal / Treatment Strategies Target  Date Initials Outcome Date Completed   11/19/2019 Self -  Current  Area of Treatment Focus:  Symptoms and patterns of relapse    Goal:  Identify personal relapse and self-sabotage patterns.    Treatment Strategies:  (Note: Frequency of all strategies are once unless specifically noted.)    Client will complete the Recovery Care Plan while in Phase 3 and share with the group.                          4/17/20                       CN                       Completed satisfactorily                       3/30/2020     Recovery Environment     DIMENSION 6  RISK FACTOR: 3                 Date Assigned Source Area of Treatment Focus / Goal / Treatment Strategies Target  Date Initials Outcome Date Completed   11/19/2019 Self -  Current  Area of Treatment Focus:  Family/significant others are non-supportive.        Goal:  Build a sober support network, celebrate accomplishments, and develop/maintain a safe living environment    Treatment Strategies:  (Note: Frequency of all strategies are once unless specifically noted.)    Client will attend a minimum of 1 support meeting each week and report back my experience to the group.    Client will explore online support groups to supplement in person support group attendance and share experience with group  [email or web address]    Client will obtain a temporary sponsor/mentor within the next 30 days and maintain contact with this individual until permanent sponsor/mentor is obtained                             4/27/20 4/27/20 12/23/19                           JUANY HARRINGTON " "                          Completed        Not Completed      Not Completed                                 3/30/2020       No additional protection measures required other than the Program Abuse Prevention Plan - No    All interventions that are designated as \"current\" will need to be completed in order to transition out of treatment with a favorable prognosis. The treatment plan is a flexible document and a work in progress. Interventions and goals may be added at any time to customize the treatment plan to each individuals needs. Client may work with therapist/counselor to change interventions as long as they pertain to the goals stipulated in the plan and/or are clinically driven.      1. I have participated in creating my treatment plan with my therapist on 11/19/2019. I agree with the plan as it is written in the electronic health record.            3. Last Use Date: 6/17/19      Prieto Guzmán    ________________________ 11/19/2019  Client Name    Signature    Date      PRAVIN Quinones _____________________________            11/19/2019  Name of Therapist   Signature    Date      "

## 2019-11-19 NOTE — PROGRESS NOTES
Patient Safety Plan Template    Name:   Prieto Guzmán YOB: 1965 Age:  54 year old MR Number:  [id number]   Step 1: Warning signs (Thoughts, images, mood, situation, behavior) that a crisis may be developin. Stress with Mom     2. Hanging out with old friends     3. Overwhelming situations at home     Step 2: Internal coping strategies - Things I can do to take my mind off of my problems without contacting another person (relaxation technique, physical activity):     1. Riding bike     2. hunting     3. Gym / walking     Step 3: People and social settings that provide distraction:     1. Name: Chandni   Phone: [phone]   2. Name: Christiano   Phone: [phone]   3. Place: gym   4. Place: library     The one thing that is most important to me and worth living for is: kids     Step 4: People whom I can ask for help:     1. Name: Chandni   Phone: [phone]     2. Name: Christiano   Phone: [phone]     3. Name: Sonya   Phone: [phone]     Step 5: Professionals or agencies I can contact during a crisis:      1. Clinician Name: MERARI Quinones, [facility] Phone: [phone]   Clinician Pager or Emergency Contact #: [phone]  -   2. Clinician Name: HANNAH   Phone: HANNAH     Clinician Pager or Emergency Contact #: [phone]     3. Local Urgent Care Services: [facility]      Urgent Care Services Address: [address] [place] [address]    Urgent Care Services Phone: [phone]       4. Suicide Prevention [facility] Phone: [phone])     Step 6: Making the environment safe:     1. No alcohol in house     2. No using friends in house     Safety Plan Template 2008 Carline Hull and Eduardo Newton is reprinted with the express permission of the authors.  No portion of the Safety Plan Template may be reproduced without the express, written permission.  You can contact the authors at [email or web address] or [email or web address].

## 2019-11-19 NOTE — PROGRESS NOTES
Initial Services Plan        Service Initiation Date: 11/19/2019    Immediate health and/or safety concerns: No    Identify health and safety concern(s) below and include plan to address:    None Identified    Treatment suggestions for client during the time between intake (admit date) and completion of the individual treatment plan:   Client and counselor will complete treatment plan during this session, but client is encouraged to spend time getting to know his peer and review client handbook    Completed by: PRAVIN Guerrier  Date completed: 11/19/2019 at 2:02 PM
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
## 2019-11-19 NOTE — PROGRESS NOTES
Patient:  Prieto Guzmán    Date: November 19, 2019    Comprehensive Assessment UPDATE        Comprehensive Summary Update and Review  Counselor met with patient on 11/19/2019 and reviewed the Comprehensive Assessment.    There were no changes/updates identified by patient or in chart entries.    [place]-Suicide Severity Rating Scale Reassessment   Have you ever wished you were dead or that you could go to sleep and not wake up?  Past Month:  No   Have you actually had any thoughts of killing yourself?  Past Month:  No   Have you been thinking about how you might do this?     Past Month:  No Lifetime:  No   Have you had these thoughts and had some intention of acting on them?     Past Month:  No Lifetime:  No   Have you started to work out the details of how to kill yourself?   Past Month:  No Lifetime:  No   Do you intend to carry out this plan?   No   When you have the thoughts how long do they last?  N/A   Are there things - anyone or anything (i.e. family, Sabianist, pain of death) that stopped you from wanting to die or acting on thoughts of suicide?  Does not apply     2008  [employer]  Used with permission by Brianda Reyna, PhD.       Guide to C-SSRS Risk Ratings   NO IDEATION:  with no active thoughts IDEATION: with a wish to die. IDEATION: with active thoughts. Risk Ratings   If Yes No No 0 - Very Low Risk   If NA Yes No 1 - Low Risk   If NA Yes Yes 2 - Low/moderate risk   IDEATION: associated thoughts of methods without intent or plan INTENT: Intent to follow through on suicide PLAN: Plan to follow through on suicide Risk Ratings cont...   If Yes No No 3 - Moderate Risk   If Yes Yes No 4 - High Risk   If Yes Yes Yes 5 - High Risk   The patient's ADDITIONAL RISK FACTORS and lack of PROTECTIVE FACTORS may increase their overall suicide risk ratings.     Additional Risk Factors:    Significant history of having untreated or poorly treated mental health symptoms  "  Protective Factors:    An absence of mental health issues or stable and well treated mental health issues     Risk Status   0. - Very Low Risk:  Evaluation Counselors:  Document in Epic / SBAR to counselor \"Very Low Risk\".      Treatment Counselors:  Reassess upon admission as applicable, assess weekly in progress notes under Dimension 3 and summarize in Discharge / Treatment summary under Dimension 3.   Additional information to support suicide risk rating: There was no additional information to provide at this time.     "

## 2019-11-20 ENCOUNTER — HOSPITAL ENCOUNTER (OUTPATIENT)
Dept: BEHAVIORAL HEALTH | Facility: CLINIC | Age: 54
End: 2019-11-20
Attending: SOCIAL WORKER
Payer: MEDICAID

## 2019-11-20 PROCEDURE — H2035 A/D TX PROGRAM, PER HOUR: HCPCS | Mod: HQ

## 2019-11-22 NOTE — ADDENDUM NOTE
Encounter addended by: [facility] on: 11/22/2019 9:09 AM   Actions taken: Delete clinical note

## 2019-12-02 ENCOUNTER — HOSPITAL ENCOUNTER (OUTPATIENT)
Dept: BEHAVIORAL HEALTH | Facility: CLINIC | Age: 54
End: 2019-12-02
Attending: SOCIAL WORKER
Payer: MEDICAID

## 2019-12-02 PROCEDURE — H2035 A/D TX PROGRAM, PER HOUR: HCPCS | Mod: HQ

## 2019-12-02 NOTE — PROGRESS NOTES
Skills Group Progress Note   Name: Prieto Guzmán  MR#: [id number]     Monday    Group Date: 12/2/2019   Group Attendance Attended group session   Group Therapy Type Psychoeducation and Psychotherapeutic   Group Topic Covered Mindfulness   Client's Response To Group Topic Cooperative with task   Client Group Participation Detail Adequate participation   Group Attendance (Time) 3.0 Hours   Individual Attendance None   Family Attendance None   Other Comments/Information None      Total # of Mental Health Group Sessions: 1  Total # of Mental Health Individual Sessions: 0    DIMENSION 3:  Emotional/Behavioral/Cognitive Conditions and Complications  The degree to which any condition or complications are likely to interfere with treatment for substance abuse or with function in significant life areas and the likelihood of risk of harm to self or others.     Emotional/Behavioral - Current Risk Factor:  2    DSM-5 Diagnoses:   Client meets criteria for SADIQ     Goal(s):   Pending    Client reports taking the following medications; hydroxyzine    Current Outpatient Medications   Medication     alcohol swab prep pads     amLODIPine (NORVASC) 10 MG tablet     atorvastatin (LIPITOR) 20 MG tablet     blood glucose (NO BRAND SPECIFIED) test strip     blood glucose calibration (NO BRAND SPECIFIED) solution     insulin detemir (LEVEMIR VIAL) 100 UNIT/ML vial     insulin regular (HUMULIN R/NOVOLIN R VIAL) 100 UNIT/ML vial     thin (NO BRAND SPECIFIED) lancets     No current facility-administered medications for this encounter.        Latest PHQ-9: Client scored 8 and indicated the symptoms are; very difficult to their daily living.  Latest SADIQ-7: Client scored 11 and indicated the symptoms are; somewhat difficult to their daily living.    Data: Client is scheduled for a DA with Fang Mcdowell on 12/3/2019. Client rates his anxiety at a 7 on a scale of 1 - 10 with 10 being the highest. Client states his anxiety is due to  "\"stress at home\". He talked about his significant other having twin 19 year old boys with special needs that require a lot of additional attention and accommodations. Client denies any suicidal thought, plan or intent this week. Client also denies any thoughts or behaviors of self harm this week.     Intervention:   This week we talked about DBT Mindfulness.  We discussed  what skills . We reviewed observing, describing and participating and what each of these means and how they tie in to mindfulness practice overall. Client was then asked to break in to pairs and come up with their own way of practicing the skills listed on the handout outlining observing, describing and practicing. Client s group will share their ideas next week in group.      Assessment:      Client is currently in the pre-contemplative stage of change. Client was challenged to consider if he may have a problem. We addressed this by looking through his treatment history during initial appointment with him and outlining the idea of exploring further what he has worked on and what has been helpful in the past during his periods of abstinence.     Client does not present overly engaged or motivated to make changes. He seems externally motivated.       Plan:   Client will continue with individual therapy as recommended throughout treatment.   Client will complete a DA with Fang on 12/3/2019  Client will report back to group next week the ideas they came up with this week in their partner work.         Fang Mcdowell MA, LADC, LPCC  Licensed Psychotherapist  "

## 2019-12-03 ENCOUNTER — HOSPITAL ENCOUNTER (OUTPATIENT)
Dept: BEHAVIORAL HEALTH | Facility: CLINIC | Age: 54
End: 2019-12-03
Attending: SOCIAL WORKER
Payer: MEDICAID

## 2019-12-03 PROCEDURE — H2035 A/D TX PROGRAM, PER HOUR: HCPCS | Mod: HQ

## 2019-12-03 PROCEDURE — H2035 A/D TX PROGRAM, PER HOUR: HCPCS

## 2019-12-03 NOTE — PROGRESS NOTES
"[facility] Adult Outpatient Program   Late note due to client's no shows no calls from group all last week.     PATIENT'S NAME: Prieto Guzmán  PREFERRED NAME: Antwon  PREFERRED PRONOUNS: He/Him/His/Himself  MRN:   [id number]  :   1965  ACCT. NUMBER: [id number]  DATE OF SERVICE: 19  START TIME: 12:45 pm  END TIME: 1:45 pm  PREFERRED PHONE: [phone]  May we leave a program related message: Yes    STANDARD DIAGNOSTIC ASSESSMENT    VIDEO VISIT: No    Identifying Information:  Patient is a 54 year old, .  The pronoun use throughout this assessment reflects the sex of the patient at birth.  Patient was referred for an assessment as part of his outpatient programming.  Patient attended the session alone.     The patient describes their cultural background to include; playing sports in high school, attending [employer] school, having a stable family life and exposure to some mild drinking. The patient reports there are no ethnic, cultural or Baptist factors that may be relevant for therapy.  Patient identified his preferred language to be English. Patient reported he does not need the assistance of an  or other support involved in therapy. Modifications will not be used to assist communication in therapy.   Patient reports he is able to understand written materials.    Chief Complaint:   The reason for seeking services at this time is: \"participating in a dual program\".      History of Presenting Concern:  The problem(s) began in  when his wife told him she wanted to see other people; specifically women. He describes it as \"down hill after that\". He talked about consequences around DUI's, increased anxiety symptoms and increase in stress. Patient has attempted to resolve these concerns in the past through SAULO treatment, marriage counseling, psychatrists, and medical doctors.  Patient reports that other professional(s) are currently involved in providing support " "/ services.  He states he is currently being prescribed medications through a primary doctor at [facility].     Social/Family History:  Patient reported he grew up in [place], [place].  They were raised by biological parents.  They were the first born of 6 children. Client states his Dad passed away in 2009 however parents were  up to that point.  Patient reported that his childhood was \"structured, 12 years of [employer] school, parents were workaholics but felt they made family time a priority too with camping and fishing\".  Patient described his current relationships with family of origin as \"strained\".      Patient's highest education level was associate degree / vocational certificate. Patient did not identify any learning problems.     Patient reported the following relationship history;  for 33 years however  back in 2013 and are currently moving forward with finalizing a divorce.Client is currently with a significant other for the last year. Patient identified their sexual orientation as heterosexual.  Patient reported having 2 children.     Patient's current living/housing situation involves living with his girlfriend of one year and her two children. He states she owns the house.  Patient identified partner, siblings and friends as part of their support system.  Patient identified the quality of these relationships as stable and meaningful.      Patient is currently semi-retired.  Patient did not serve in the .  Patient reports their finances are obtained through [facility] fund.  Patient does not identify finances as a current stressor.      Patient reported that he has been involved with the legal system.  Client is currently on probation for a burglary charge. He is currently on probation until 2033 in [facility].     Medical Issues:  Patient reports family history includes Alcoholism in an other family member; Alzheimer Disease in his father; Diabetes in his " maternal grandfather and mother.    Patient has had a physical exam to rule out medical causes for current symptoms.  Date of last physical exam was within the past year. Client was encouraged to follow up with PCP if symptoms were to develop. The patient has a [employer] Primary Care Provider, who is named Izzy Gao.  Patient reports the following current medical concerns: Type 2 Diabetes, hypertension and insomnia; all of which are managed. .  They did report dental concerns. Client states he has been trying to make an appointment.  There are not significant appetite / nutritional concerns / weight changes. Client states this time of year he always puts on a few extra pounds as he is not as active as in the warmer months.  The patient has not been diagnosed with an eating disorder.  The patient denies the presence of chronic or episodic pain.  Patient does report a history of head injury / trauma / cognitive impairment.  Client states he has had several head injuries in the past while snowmobiling and playing football.    Patient reports current meds as:   Hydroxyzine    Medication Adherence:  Patient reports taking prescribed medications as prescribed    Patient Allergies:  Allergies   Allergen Reactions     Lisinopril        Mental Health History:  Patient did report a family history of mental health concerns; depression on maternal side, and anxiety on maternal side.   Patient previously received the following mental health diagnosis: Anxiety and PTSD.  Patient reported symptoms began in 1987.   Patient has received the following mental health services in the past: SAULO treatment, psychiatry and meeting with medical doctors.  Hospitalizations: None.  Patient denies a history of civil commitment.  Patient is currently receiving the following services: as part of his outpatient SAULO programming..        Current Mental Status Exam:   Appearance:  Appropriate   Eye Contact:  Good   Psychomotor:  Normal        Gait / station:  no problem  Attitude / Demeanor: Cooperative  Guarded   Speech      Rate / Production: Normal       Volume:  Normal  volume      Language:  Rate/Production: Normal    Mood:   Normal  Affect:   Appropriate   Thought Content: Clear   Thought Process: Coherent  Logical       Associations: Volume: Normal    Insight:   Fair   Judgment:  Intact   Orientation:  All  Attention/concentration: Good      Review of Symptoms:  Depression: No symptoms  Bernice:  No Symptoms  Psychosis: No Symptoms  Anxiety: Excessive worry, Nervousness, Physical complaints, such as headaches, stomachaches, muscle tension, Sleep disturbance, Ruminations, Irritaiblity and Anger outbursts  Panic:  Palpitations, Shortness of breath and Sense of impending doom  Post Traumatic Stress Disorder: Experienced traumatic event, Reexperiencing of trauma and Nightmares  Eating Disorder: No Symptoms  Oppositional Defiant Disorder:  No Symptoms  ADD / ADHD:  No symptoms  Conduct Disorder: No symptoms  Autism Spectrum Disorder: No symptoms  Obsessive Compulsive Disorder: No Symptoms  Other Compulsive Behaviors: None   Substance Use: blackouts, passing out, vomiting, hangovers, daily use, substance related legal problems, substance use as work, work absence due to substance use, family relationship problems due to substance use, social problems related to substance use, driving under the influence, riding with someone under the influence and cravings/urges to use    Rating Scales:  PHQ9     PHQ-9 SCORE 10/15/2019   PHQ-9 Total Score 7     GAD7     SADIQ-7 SCORE 10/15/2019   Total Score 5     CGI   Clinical Global Impressions  Initial result: 4     Most recent result: 4       Substance Use History:  Patient did report a family history of substance use concerns; see medical history section for details.  Patient has received chemical dependency treatment in the past at several programs; this will be client's 6th treatment episode.  Patient has not ever  been to detox.      Patient is currently receiving the following services: CD Treatment at [facility]. Patient reported the following problems as a result of their substance use: DUI, family problems, legal issues, occupational / vocational problems and relationship problems.     Patient their first use of alcohol at 11. Client states at the heaviest period of use he was drinking about a quart of hard alcohol and a case of beer a day. Client reports his last use was 6/17/2019.    Patient their first use of tobacco was at 11. Client states at the heaviest period of use he was smoking three packs a day. Client reports his last use was 12/3/2019    Patient is concerned about substance use. , Patient reports experiencing the following withdrawal symptoms within the past 12 months: shaky/jittery/tremors, unable to sleep, agitation, fatigue, vivid/unpleasant dreams, irritability, sensitivity to noise, high blood pressure, nausea/vomiting, confused/disrupted speech and anxiety/worry and the following within the past 30 days: unable to sleep and vivid/unpleasant dreams.   Patients reports urges to use Alcohol.  Patient reports @HE@ has used more Alcohol than intended and over a longer period of time than intended. Patient reports @HE@ has had unsuccessful attempts to cut down or control use of Alcohol.  Patient reports longest period of abstinence was 1 years and return to use was due to completing treatment. Patient reports @HE@ has needed to use more Alcohol to achieve the same effect.  Patient does  report diminished effect with use of same amount of Alcohol.  , Patient does  report a great deal of time is spent in activities necessary to obtain, use, or recover from Alcohol effects.  Patient does  report important social, occupational, or recreational activities are given up or reduced because of Alcohol use.  Alcohol use is continued despite knowledge of having a persistent or recurrent  physical or psychological problem that is likely to have caused or exacerbated by use., Patient reports the following problem behaviors while under the influence of substances relationship, self care, and legal., Patient reports their recovery goals are getting drivers license back, find employment, and stay sober..      Based on the positive CAGE score and clinical interview there  are indications of drug or alcohol abuse. Client has already completed an evaluation and started SAULO treatment..    Significant Losses / Trauma / Abuse / Neglect Issues:   There are indications or report of significant loss, trauma, abuse or neglect issues related to:family, witnessing violence and loss as a .    Concerns for possible neglect are not present.     Safety Assessment:  Current Safety Concerns:  Patient denies any current suicidal thoughts, plan or intent.  Patient denies current homicidal ideation and behaviors.  Patient denies current self-injurious ideation and behaviors.    Patient reported unsafe motor vehicle operation reported engaging in illegal activities, such as burglary associated with substance use.  Patient denies any high risk behaviors associated with mental health symptoms.  Patient reports the following current concerns for their personal safety: None.  Patient reports there are no firearms in the house.     History of Safety Concerns:  Patient denied a history of homicidal ideation.     Patient reported a history of self-injurious ideation.  Client states he once, in April 2019, put a cigar out on his hand.   Patient denied a history of personal safety concerns.    Patient denied a history of assaultive behaviors.    Patient denied a history of assaultive behaviors.    Patient reported a history unsafe motor vehicle operation reported a history of engaging in illegal activities, such as burglary associated with substance use.  Patient denies any history of high risk behaviors  associated with mental health symptoms.    Patient reports the following protective factors: spirituality, restricted access to lethal means no access to guns, abstinence from substances, adherence with prescribed medication and agreement to use safety plan    See Preliminary Treatment Plan for Safety and Risk Management Plan    Patient's Strengths and Limitations:  Patient identified the following strengths or resources that will help him succeed in treatment: commitment to health and well being, aldo / spirituality and friends / good social support. Things that may interfere with the patient's success in treatment include: few friends, lack of social support and transportation concerns.     Diagnostic Criteria:  A. Excessive anxiety and worry about a number of events or activities (such as work or school performance).   B. The person finds it difficult to control the worry.  C. Select 3 or more symptoms (required for diagnosis). Only one item is required in children.   - Difficulty concentrating or mind going blank.    - Irritability.    - Muscle tension.    - Sleep disturbance (difficulty falling or staying asleep, or restless unsatisfying sleep).   D. The focus of the anxiety and worry is not confined to features of an Axis I disorder.  E. The anxiety, worry, or physical symptoms cause clinically significant distress or impairment in social, occupational, or other important areas of functioning.   F. The disturbance is not due to the direct physiological effects of a substance (e.g., a drug of abuse, a medication) or a general medical condition (e.g., hyperthyroidism) and does not occur exclusively during a Mood Disorder, a Psychotic Disorder, or a Pervasive Developmental Disorder.    Functional Status:  Patient's  symptoms have resulted in the following functional impairments: childcare / parenting, operation of a motor vehicle, relationship(s), self-care, social interactions and work / vocational  responsibilities    DSM5 Diagnoses: (Sustained by DSM5 Criteria Listed Above)  Diagnoses: 300.02 (F41.1) Generalized Anxiety Disorder     Psychosocial & Contextual Factors: Client is a 54 year old male currently living with his girlfriend and her two boys. He reports being in this relationship for the past year. He denied currently working but expressed interested in pursuing employment options. He reports he currently has an offer on a house in [place], [place] and is hoping to move up [place] again. He states his girlfriend is going to have to make a decision. He denied any financial concerns and states he is living off his senior care accounts.     Preliminary Treatment Plan:  Plan for Safety and Risk Management:   Recommended that patient call 911 or go to the local ED should there be a change in any of these risk factors.     Collaboration:  Collaboration / coordination with other professionals is not indicated at this time.    The following referral(s) will be initiated: Outpatient Mental [facility] Therapy.  Next Scheduled Appointment: 12/11/2019 at 8am. .  A Release of Information is not needed at this time.     Patient's identified no major concerns that needed to be addressed at this time.    Initial Treatment will focus on: decreasing anxiety symptoms.     Resources/Service Plan:       services are not indicated.     Modifications to assist communication are not indicated.     Additional disability accommodations are not indicated     Discussed the general effects of drugs and alcohol on health and well-being. Provider gave patient printed information about the effects of chemical use on his health and well being.    Records were reviewed at time of assessment.    Report to child / adult protection services was NA.    Information in this assessment was obtained from the medical record and provided by patient who is a fair historian.     Patient will have open access to their mental health medical  record.    Fang Mcdowell MA, [facility], [facility]  December 3, 2019

## 2019-12-03 NOTE — PROGRESS NOTES
Prieto SHELDON Averymaricelpeewee  [id number]               Adult CD Progress Note and Treatment Plan Review     Attendance     Tuesday     Group Date: 12/3/19   Group Attendance Attended group session   Group Therapy Type Psychoeducation and Psychotherapeutic   Group Topic Covered Self Awareness/Purpose   Client's Response To Group Topic Cooperative with task   Client Group Participation Detail Adequate participation   Group Attendance (Time) 3.0 Hours   Individual Attendance None   Family Attendance None   Other Comments/Information None      Wednesday    Group Date: 12/4/19   Group Attendance Attended group session   Group Therapy Type Psychoeducation   Group Topic Covered Self Awareness   Client's Response To Group Topic Cooperative with task   Client Group Participation Detail Adequate participation   Group Attendance (Time) 3.0 Hours   Individual Attendance None   Family Attendance None   Other Comments/Information None     Total # of Phase 1 Group Sessions: 3     Total # of Phase 2 Group Sessions: NA  Total # of Phase 3 Group Sessions: NA  Total # of 1:1 Sessions: 1    Support group attended this week: yes    Reporting sobriety: Yes    Treatment Plan Review     Treatment Plan Review completed on:  12/3/2019     Projected discharge date: 4/11/20    Client preferred learning style: Hands on    Staff member(s) contributing: Mallory GAGE, [facility]    Received supervision: Client's case was reviewed this week with treatment team which included; DIANA Rogers, [facility], [facility], PRAVIN Narayanan, and MERARI Quinones, [facility]. Client's treatment goals and progress towards these goals were reviewed and it was determined no changes were needed this week.    Client involvement with treatment planning: contributed to goals and plan.    Client received copy of plan/revised plan: Yes    Client agrees with plan/revised plan: Yes    Changes to Treatment Plan: No    Client's Dimension 1 Goal(s) Prieto will develop effective  strategies to maintain abstinence and manage symptoms of PAWS   See below.   Client's Dimension 2 Goal(s) Prieto will improve and maintain healthy self care habits Goal not addressed this week.   Client's Dimension 3 Goal(s) Pending Goal not addressed this week.   Client's Dimension 4 Goal(s) Prieto will be able to verbalize the need for support from this outpatient program  Prieto will develop intrinsic motivation to maintain recovery efforts See below.   Client's Dimension 5 Goal(s) Identify personal patterns of relapse and self sabotage  Goal not addressed this week.   Client's Dimension 6 Goal(s) Build a sober support network, celebrate accomplishments, and develop/maintain a safe living environment  Remain compliant with conditions of probation See below.     New Goals added since last review: None.    Goal(s) worked on since last review:   Prieto will be able to verbalize the need for support from this outpatient program  Build a sober support network, celebrate accomplishments, and develop/maintain a safe living environment  Prieto will develop effective strategies to maintain abstinence and manage symptoms of PAWS  Remain compliant with conditions of probation    Strategies effective: Yes    Treatment Coordination Activities: None.    Medical, Mental Health and other appointments the client attended:   Yes - Client had session with Fang Mcdowell MA, L[facility], LP[facility] to complete DA.  Client had meeting with  in [facility]       Medication issues: None.    Physical and mental health problems: None.    Review and evaluation of the individual abuse prevention plan: The program's abuse prevention plan (ART) is sufficient for this client.     Substance Use Disorders:    303.90 (F10.20) Alcohol Use Disorder Severe    ASAM Risk Rating:     Dimension 1 0 No change    Dimension 2 0 No Change    Dimension 3 2 No Change    Dimension 4 2 No Change    Dimension 5 3 No Change      Dimension 6 2 No  "Change    Data:   Client reports drug of choice as alcohol with last day of use reported as 6/17/19. Client reports his overall health at an 8 out of 10 and reports he engaged in \"yardwork\" including putting up [employer]. Client reports he experienced frustration over \"being interrupted\" while working, but coped with his frustration by stopping \"for a while\" and going inside to practice guitar or \"meditate\". Client reports he is also frustrated because he knows he could be working if her were \"[place] north\", but due to legal involvement, he has to stay in this area for now. Client reports there is a house he [place] make [place] but his divorce is not final yet and is creating a problem. Client states his girlfriend is \"overprotective\" of her twin \"special needs\" sons and doesn't have a lot of time to spend with him alone. Client reports he attended 1 meeting,   Intervention:   Tuesday and Wednesday - The client learned about the journey of being the hero in his life. This was done through exploring Adrian Qureshi's stages of The Hero's Journey. Once the client reviewed all of the following stages, the client was given the assignment to pick from a list of films and chart the journey of the main character.  1.Ordinary World: This step refers to the hero's normal life at the start of the story, before the adventure begins.  This is the life before the individual is confronted with his/her dependence on drugs/alcohol  2. Call to Adventure: The hero is faced with something that makes him begin his adventure. This might be a problem or a challenge he needs to overcome. This is the catalyst; perhaps a legal consequence, overdose, or hospitalization due to use  3. Refusal of the Call: The hero attempts to refuse the adventure because he is afraid.  Often seen as denial of the problem  4. Meeting with the [place]: The hero encounters someone who can give him advice and ready him for the journey " "ahead. The referral for services  5. Crossing the First Threshold: The hero leaves his ordinary world for the first time and crosses the threshold into adventure.  The individual enters treatment of some type  6. Tests, Allies, Enemies: The hero learns the rules of his new world. During this time, he endures tests of strength of will, meets friends, and comes face to face with foes.  The individual begins the process of learning, building support, and dealing with consequences  7. Approach: Setbacks occur, sometimes causing the hero to try a new approach or adopt new ideas.  Sometimes the individual slips or relapses  8. Ordeal: The hero experiences a major german or obstacle, such as a life or death crisis.  Because of a relapse, the individual is often faced with legal or family ultimatums and begins to set things right and begin the journey of Recovery  9. Reward: After surviving death, the hero earns his reward or accomplishes his goal.  Recovery brings many rewards  10. The Road Back: The hero begins his journey back to his ordinary life.  The individual returns to his/her life as an individual in recovery  11. Resurrection Hero - The hero faces a final test where everything is at stake and he must use everything he has learned. This can be cutting off old using friends, making new friends, engaging in new and healthy activities, obtaining and working with a sponsor, ect   12. Return with Elixir: The hero brings his knowledge or the \"elixir\" back to the ordinary world, where he applies it to help all who remain there.  Carrying the message, helping others, giving back, living a life of purpose      Assessment:    Stages of Change Model  Contemplation  Preparation/Determination   Client is cooperative, but appears to be ambivalent about need for lifestyle change. Client also appears to blame others for consequences  Client appears to struggle with admitting the severity of his addiction. Client also appears to " "have somewhat of a \"victim like\" mentality and struggles to see his part in the unpleasant situations he finds himself in.    Appears/Sounds:  Cooperative  Ambivalent    Plan:   -Client will complete task on the \"Hero's Journey\" and be prepared to share on Tuesday.    PRAVIN Guerrier  "

## 2019-12-04 ENCOUNTER — HOSPITAL ENCOUNTER (OUTPATIENT)
Dept: BEHAVIORAL HEALTH | Facility: CLINIC | Age: 54
End: 2019-12-04
Attending: SOCIAL WORKER
Payer: MEDICAID

## 2019-12-04 PROCEDURE — H2035 A/D TX PROGRAM, PER HOUR: HCPCS | Mod: HQ

## 2019-12-07 NOTE — ADDENDUM NOTE
Encounter addended by: Mallory Velez [facility] on: 12/7/2019 2:40 PM   Actions taken: Clinical Note Signed

## 2019-12-09 ENCOUNTER — HOSPITAL ENCOUNTER (OUTPATIENT)
Dept: BEHAVIORAL HEALTH | Facility: CLINIC | Age: 54
End: 2019-12-09
Attending: SOCIAL WORKER
Payer: MEDICAID

## 2019-12-09 PROCEDURE — H2035 A/D TX PROGRAM, PER HOUR: HCPCS | Mod: HQ

## 2019-12-09 NOTE — PROGRESS NOTES
Skills Group Progress Note   Name: Prieto Guzmán  MR#: [id number]     Monday    Group Date: 12/9/2019   Group Attendance Attended group session   Group Therapy Type Psychoeducation and Psychotherapeutic   Group Topic Covered Mindfulness   Client's Response To Group Topic Cooperative with task   Client Group Participation Detail Adequate participation   Group Attendance (Time) 3.0 Hours   Individual Attendance None   Family Attendance None   Other Comments/Information None      Total # of Mental Health Group Sessions: 2  Total # of Mental Health Individual Sessions: 1    DIMENSION 3:  Emotional/Behavioral/Cognitive Conditions and Complications  The degree to which any condition or complications are likely to interfere with treatment for substance abuse or with function in significant life areas and the likelihood of risk of harm to self or others.     Emotional/Behavioral - Current Risk Factor:  2    DSM-5 Diagnoses:   Client meets criteria for SADIQ     Goal(s):   Pending    Client reports taking the following medications; hydroxyzine    Current Outpatient Medications   Medication     alcohol swab prep pads     amLODIPine (NORVASC) 10 MG tablet     atorvastatin (LIPITOR) 20 MG tablet     blood glucose (NO BRAND SPECIFIED) test strip     blood glucose calibration (NO BRAND SPECIFIED) solution     insulin detemir (LEVEMIR VIAL) 100 UNIT/ML vial     insulin regular (HUMULIN R/NOVOLIN R VIAL) 100 UNIT/ML vial     thin (NO BRAND SPECIFIED) lancets     No current facility-administered medications for this encounter.        Latest PHQ-9: Client scored 8 and indicated the symptoms are; very difficult to their daily living.  Latest SADIQ-7: Client scored 11 and indicated the symptoms are; somewhat difficult to their daily living.    Data: Client meets criteria for Generalized Anxiety Disorder based on his DA [facility]cd[facility]. Client rates his anxiety at a 6, depression at a 4 on a  "scale of 1 - 10 with 10 being the highest. Client states his anxiety is around closing on a new house he purchased on [place], his depression is due to \"short days\". He talked about how he has wanted to move back to [place] and now it working on this plan. He states he talked with his girlfriend about what she is going to do and no decision has been made if she will move with him or not. He states he will close in about a month but doesn't plan on moving up there until maybe May. It's questionable if this is true; I suspect he may move up there sooner. Client's protective factors include, group, girlfriend. Client's potential risk factors include; lack of willingness, lack of healthy coping strategies and lack of sober support network. Client denies any suicidal thought, plan or intent this week. Client also denies any thoughts or behaviors of self harm this week.     Intervention:   This week we talked about  How  skills for mindfulness. We reviewed the states of mind, we reviewed the assignment from last week regarding the new ideas for  What  skills and learned about the  how  skills. We talked about non-judgmental, one mindedness and effectiveness as it relates to the  how  skills and overall mindfulness. Clients were asked to break in to pairs and come up with their own ideas for each of these categories to better help them understand the concept. Then we came together to share our ideas and understanding. Client was also provided a packet of worksheets that encompasses all of the mindfulness skills to complete by next Tuesday to share in group.    Assessment:      Client is currently in the pre-contemplative stage of change. Client was challenged to consider if he may have a problem. We continue to explore this by having client talk about the negative consequences of his using and the pros of continued use.     Client presents externally motivated. He does not seem to share much of himself and does not present " as though he is being honest.       Plan:   Client will continue with individual therapy as recommended throughout treatment.   Client will share their completed mindfulness packets next week in group.        Fang Mcdowell MA, LADC, [facility]  Licensed Psychotherapist

## 2019-12-10 ENCOUNTER — HOSPITAL ENCOUNTER (OUTPATIENT)
Dept: BEHAVIORAL HEALTH | Facility: CLINIC | Age: 54
End: 2019-12-10
Attending: SOCIAL WORKER
Payer: MEDICAID

## 2019-12-10 LAB
AMPHETAMINES UR QL SCN: NEGATIVE
BARBITURATES UR QL: NEGATIVE
BENZODIAZ UR QL: NEGATIVE
CANNABINOIDS UR QL SCN: NEGATIVE
COCAINE UR QL: NEGATIVE
ETHANOL UR QL SCN: NEGATIVE
OPIATES UR QL SCN: NEGATIVE
PCP UR QL SCN: NEGATIVE

## 2019-12-10 PROCEDURE — H2035 A/D TX PROGRAM, PER HOUR: HCPCS | Mod: HQ

## 2019-12-10 PROCEDURE — 80320 DRUG SCREEN QUANTALCOHOLS: CPT

## 2019-12-10 PROCEDURE — 80307 DRUG TEST PRSMV CHEM ANLYZR: CPT

## 2019-12-11 ENCOUNTER — HOSPITAL ENCOUNTER (OUTPATIENT)
Dept: BEHAVIORAL HEALTH | Facility: CLINIC | Age: 54
End: 2019-12-11
Attending: SOCIAL WORKER
Payer: MEDICAID

## 2019-12-11 PROCEDURE — H2035 A/D TX PROGRAM, PER HOUR: HCPCS | Mod: HQ

## 2019-12-11 PROCEDURE — H2035 A/D TX PROGRAM, PER HOUR: HCPCS

## 2019-12-11 NOTE — PROGRESS NOTES
"Treatment Contract    We want you to succeed in your chemical dependency program. Your counselor is worried that your behavior may get in the way of reaching your treatment goals. So we have held a care conference to address these issues. The following treatment contract will help you get back on track.    I understand the following behavior does not let me fully take part in chemical dependency treatment at [facility]:  __X__ Excessive or unexcused absences from group sessions  ____ Disruptive, disrespectful, intimidating or uncooperative behavior  ____ Using chemicals  __X__ Failure to follow program guidelines or treatment goals  ____ Possible undiagnosed or untreated mental health issues  ____ Other:     As shown by: Client has missed groups unexcused with no communication for staff.     To continue in chemical dependency treatment at [facility], I must:  __X__ Be on time for group  __X__ Finish treatment goal assignments in a timely manner  __X__ Demonstrate behavior consistent with recovery  ____ Have a psychological (mental health) exam  ____ Sign releases of information for\" ____ for the purpose of ____  __X__ Other:  Client must communicate with staff prior to any absences from group and this must be discussed and approved by staff prior to absence. Client will bring in any appropriate verification documentation for probation or court meetings.   __X__ Other: Client will actively participate in group; being honest about his past , present and future.   __X__ Other: Client will complete all assigned treatment work on his treatment plan or during group.     If the above goals are not met, I understand I will be discharged (sent home) from this outpatient program and given referrals for other care or services.      ________________      ______________________________________________    Date/Time   Patient signature    ________________     "  ______________________________________________    Date/Time   Staff signature    ________________      ______________________________________________    Date/Time   Manager/Lead counselor signature

## 2019-12-11 NOTE — PROGRESS NOTES
"  Acknowledgement of Current Treatment Plan - Additional Entries     ADDITIONAL GOALS AND INTERVENTIONS:    Signatures/dates required for any additional Problems, Goals, and/or Interventions added to treatment plan:    Change/Addition in Dimension 3 on date: 12/11/2019    Insert here:   Emotional/Behavioral/Cognitive Conditions and Complications     DIMENSION 3  RISK FACTOR: 2             Date Assigned Source Area of Treatment Focus / Goal / Treatment Strategies Target  Date Initials Outcome Date Completed   11/19/2019 Self - Current Area of Treatment Focus:  Client meets criteria for Generalized Anxiety Disorder.    Goal:   Client will reduce his SADIQ-7 score from a 14 to a 7.      Treatment Strategies:  Client will read, \"Anxiety & Worry\" REBT pamphlet and share insights in therapy.     Client will then use this gained understanding to complete the REBT \"Anxiety & Worry workbook and share insights in therapy.     Client will complete three to five pages at a time out of the anxiety packet and share in therapy. We will discuss this ongoing throughout several sessions until completed.                     1/6/2020 1/20/20 2/19/2020                     JOHN LOPEZ                                     I have participated in creating this change and/or addition to my treatment plan copied above. I have been given a copy of this signature page with change/addition to my treatment plan and I am in agreement with how it is written in the electronic record.       Prieto Guzmán  ________________________ 12/11/2019  Client Name    Signature    Date      Last use date if changed: 6/17/2019    "

## 2019-12-11 NOTE — PROGRESS NOTES
INDIVIDUAL THERAPY NOTE    Data: Client came in to further discuss treatment planning goals related to his diagnosis after completing a DA with me.       Intervention: We were able to identify and work together to create a problem area on client's treatment plan to address each identified diagnosis that client met criteria for during his DA. Client had active, direct involvement in the development of these treatment goals and agreed to these goals and interventions as it is written in the electronic records.  At this time I asked Mallory Velez, co-counselor, to join me as we needed to discuss and send a united front to client about the expectations of programming. We talked about the attendance program, his motivation and the need for him to more fully engage in programing and follow expectations. We talked to client about his need to blame external things and lacks personal responsibility and accountability for his choices. We talked to him about his lack of communication when he was out. He clarified he tried to text us last time and we informed him texting does not work. We talked about needing to provide documentation to confirm court or probation meetings and then only one day is allowed for these meetings not three like last time. We reviewed that absences have to be pre-approved and the difference between something that is excuses or unexcused. We talked about being on time for group. We talked about treatment work. We talked about the anger and resentments he carries with him and how they keep him stuck and discussed that he is his only block to getting well. He was very resistant to this feedback, made excuses and continued to talk about how he has done treatment before and that he doesn't think he needs more. We talked about his options and that he doesn't have to stay in this program, we talked about alternative programs, and alternative consequences if he doesn't follow legal requirements. We talked  with him about his it is ultimately his choice and so are the potential consequences but that if he is going to stay in our program he needs to follow the program rules and expectations. Client states he is going to stay, signed the treatment contract and agreed to follow the expectations and rules of the program.       Assessment: Client presents open, willing and agreeable to his treatment goals. He was resistant tot he feedback provided by staff during this meeting. He is very externally focused for blame and presents as though he has no responsibility for his actions.     Plan: Client will begin working on his identified treatment plan goals and review all progress and completed work during ongoing individual therapy with me. Client will follow the treatment contract.

## 2019-12-16 ENCOUNTER — HOSPITAL ENCOUNTER (OUTPATIENT)
Dept: BEHAVIORAL HEALTH | Facility: CLINIC | Age: 54
End: 2019-12-16
Attending: SOCIAL WORKER
Payer: MEDICAID

## 2019-12-16 PROCEDURE — H2035 A/D TX PROGRAM, PER HOUR: HCPCS | Mod: HQ

## 2019-12-17 NOTE — PROGRESS NOTES
Prieto SHELDON Ramirez  [id number]               Adult CD Progress Note and Treatment Plan Review     Attendance     Tuesday     Group Date: 12/16/19   Group Attendance Attended group session   Group Therapy Type Psychoeducation and Psychotherapeutic   Group Topic Covered Mindfulness and Relaxation Techniques   Client's Response To Group Topic Cooperative with task   Client Group Participation Detail Adequate participation   Group Attendance (Time) 3.0 Hours   Individual Attendance None   Family Attendance None   Other Comments/Information None      Wednesday    Group Date: 12/18/19   Group Attendance Other - Left group ill   Group Therapy Type Psychoeducation and Psychotherapeutic   Group Topic Covered Relationships   Client's Response To Group Topic Other - Client was ill and left group.   Client Group Participation Detail Other - Client was ill and left group   Group Attendance (Time) Other - 20 minutes   Individual Attendance None   Family Attendance None   Other Comments/Information None     Total # of Phase 1 Group Sessions: 7     Total # of Phase 2 Group Sessions: NA  Total # of Phase 3 Group Sessions: NA  Total # of 1:1 Sessions: 1    Support group attended this week: yes    Reporting sobriety: Yes    Treatment Plan Review     Treatment Plan Review completed on:  12/16/2019     Projected discharge date: 4/11/20    Client preferred learning style: Hands on    Staff member(s) contributing: Mallory GAGE, LADC and Fang Mcdowell MA, LADC, [facility]    Received supervision: No    Client involvement with treatment planning: contributed to goals and plan.    Client received copy of plan/revised plan: Yes    Client agrees with plan/revised plan: Yes    Changes to Treatment Plan: No    Client's Dimension 1 Goal(s) Prieto will develop effective strategies to maintain abstinence and manage symptoms of PAWS   See below.   Client's Dimension 2 Goal(s) Prieto will improve and maintain healthy self care habits Goal  not addressed this week.   Client's Dimension 3 Goal(s) Pending Goal not addressed this week.   Client's Dimension 4 Goal(s) Prieto will be able to verbalize the need for support from this outpatient program  Prieto will develop intrinsic motivation to maintain recovery efforts See below.   Client's Dimension 5 Goal(s) Identify personal patterns of relapse and self sabotage  Goal not addressed this week.   Client's Dimension 6 Goal(s) Build a sober support network, celebrate accomplishments, and develop/maintain a safe living environment  Remain compliant with conditions of probation See below.     New Goals added since last review: None.    Goal(s) worked on since last review:   Prieto will be able to verbalize the need for support from this outpatient program  Build a sober support network, celebrate accomplishments, and develop/maintain a safe living environment  Prieto will develop effective strategies to maintain abstinence and manage symptoms of PAWS    Strategies effective: Yes    Treatment Coordination Activities: None.    Medical, Mental Health and other appointments the client attended: Client had session with [facility]cdowell [facility], [facility] and was then joined by MERARI Quinones, [facility] to discuss need for attendance contract. Client and staff both expressed concerns and client agreed to sign contract.    Medication issues: None.    Physical and mental health problems: None.    Review and evaluation of the individual abuse prevention plan: The program's abuse prevention plan (ART) is sufficient for this client.     Substance Use Disorders:    303.90 (F10.20) Alcohol Use Disorder Severe    ASAM Risk Rating:     Dimension 1 0 No change    Dimension 2 0 No Change    Dimension 3 2 No Change    Dimension 4 2 No Change    Dimension 5 3 No Change      Dimension 6 2 No Change    Data:   Client submitted UA on 12/10/19 which was negative for all illicit substances. Client reports last day of use  "reported as 6/17/19. Client reports his overall health at an 6 out of 10 and reports \"Join Gym, [employer]\" as forms of physical activity he engaged in. Client reports \"Baked & Made Meals & Outdoor Sports\" as strategies for coping with triggers. Client reports frustration because \"Can't get help to get things done.\" and that he deals with this by \"Just did it myself.\" Client reports family issues and concerns, \"Can't get my kids to talk.\" Client reports he continues to have no communication with his adult children and relationships are \"obviously\" strained.  Client [place] of [place] and \"really needs\" to get back to [place] to take care of \"things\". Client reports he is \"Unemployed but my divorce keeps me from working.\"  Client reports he attended no meetings this past week, that he has a sponsor. Client has agreed to and signed an attendance contract stating he will call prior to missing group and have appropriate documentation if requested when he returns to group.     Intervention:     Monday -Today client and counselor's discussed the therapeutic benefits to be had from repeating a small range of simple actions, as you do when you sew, knit or paint. Client engaged in this type of action with a sticker by number activity. These relatively non-demanding repetitive tasks allow your mind to reach a state of peaceful equilibrium, which is essential for recharging. Client completed his own detailed picture and reflected on the project. This exercise also had client engaged in a sober social activity. Client reported he is grateful for the group.    Wednesday - Client came to group and was not feeling well. He went to the clinic and is now waiting on results of strep culture so, to be safe, the client was sent home      Assessment:    Stages of Change Model  Contemplation  Preparation/Determination   Client is cooperative, but appears to be ambivalent about need for " lifestyle change.     Appears/Sounds:  Cooperative  Ambivalent    Plan:   Client will attend at least one outside support group this week and report experience in group therapy next week    PRAVIN Guerrier

## 2019-12-18 ENCOUNTER — HOSPITAL ENCOUNTER (OUTPATIENT)
Dept: BEHAVIORAL HEALTH | Facility: CLINIC | Age: 54
End: 2019-12-18
Attending: SOCIAL WORKER
Payer: MEDICAID

## 2019-12-23 ENCOUNTER — HOSPITAL ENCOUNTER (OUTPATIENT)
Dept: BEHAVIORAL HEALTH | Facility: CLINIC | Age: 54
End: 2019-12-23
Attending: SOCIAL WORKER
Payer: MEDICAID

## 2019-12-23 PROCEDURE — H2035 A/D TX PROGRAM, PER HOUR: HCPCS | Mod: HQ

## 2019-12-23 NOTE — PROGRESS NOTES
Prieto SHELDON Daniel  [id number]               Adult CD Progress Note and Treatment Plan Review     Attendance     Monday   Group Date: 12/23/19   Group Attendance Attended group session   Group Therapy Type Psychoeducation and Psychotherapeutic   Group Topic Covered Emotions/Expression/Feelings   Client's Response To Group Topic Cooperative with task   Client Group Participation Detail Adequate participation   Group Attendance (Time) 3.0 Hours   Individual Attendance None   Family Attendance None   Other Comments/Information None       Total # of Phase 1 Group Sessions: 8     Total # of Phase 2 Group Sessions: NA  Total # of Phase 3 Group Sessions: NA  Total # of 1:1 Sessions: 1    Support group attended this week: yes    Reporting sobriety: Yes    Treatment Plan Review     Treatment Plan Review completed on:  12/23/2019     Projected discharge date: 4/11/20    Client preferred learning style: Hands on    Staff member(s) contributing: Mallory GAGE, [facility]     Received supervision: No    Client involvement with treatment planning: contributed to goals and plan.    Client received copy of plan/revised plan: Yes    Client agrees with plan/revised plan: Yes    Changes to Treatment Plan: No    Client's Dimension 1 Goal(s) Prieto will develop effective strategies to maintain abstinence and manage symptoms of PAWS   Goal not addressed this week.   Client's Dimension 2 Goal(s) Prieot will improve and maintain healthy self care habits Goal not addressed this week.   Client's Dimension 3 Goal(s) Pending Goal not addressed this week.   Client's Dimension 4 Goal(s) Prieto will be able to verbalize the need for support from this outpatient program  Prieto will develop intrinsic motivation to maintain recovery efforts See below.   Client's Dimension 5 Goal(s) Identify personal patterns of relapse and self sabotage  Goal not addressed this week.   Client's Dimension 6 Goal(s) Build a sober support network, celebrate  "accomplishments, and develop/maintain a safe living environment  Remain compliant with conditions of probation See below.     New Goals added since last review: None.    Goal(s) worked on since last review:   Prieto will be able to verbalize the need for support from this outpatient program  Build a sober support network, celebrate accomplishments, and develop/maintain a safe living environment    Strategies effective: Yes    Treatment Coordination Activities: None.    Medical, Mental Health and other appointments the client attended: None  Medication issues: None.    Physical and mental health problems: None.    Review and evaluation of the individual abuse prevention plan: The program's abuse prevention plan (ART) is sufficient for this client.     Substance Use Disorders:    303.90 (F10.20) Alcohol Use Disorder Severe    ASAM Risk Rating:     Dimension 1 0 No change    Dimension 2 0 No Change    Dimension 3 2 No Change    Dimension 4 2 No Change    Dimension 5 3 No Change      Dimension 6 2 No Change    Data:   Client reports he is struggling this week because the holidays are a trigger for him and he has been triggered by memories of drinking \"Hot Toddies\" with friends, and those friends have been calling him to get together. Client reports he dealt with his cravings by planning his move, making fruit cake and making venison jerky. Client reports he is frustrated with his girlfrend's grown sons because they \"wont get off their butts and help out\" Client also reports feeling frustrated that he is not working and [place]. Client reports his kids are still not talking to him but he has started communicating with his brother again and he \"wants to remain on good terms\" with him. Client reports he went out driving with his girlfriend to look at [employer] lights and he went to a meeting as healthy sober activities he engaged in. Client reports he is anxious about his upcoming court " date in January    Intervention:     Monday - Early recovery can feel like an emotional rollercoaster with all the ups and downs that can arise, especially during the holidays. That s why emotional sobriety is a key factor in early recovery. Emotional sobriety is the ability to cope with the many emotions that come with physical sobriety. It means being able to handle your feelings head on in a positive and productive way. Ways we discussed were not being so hard on ourselves and giving ourselves a break if things get too chaotic, reaching out for help if we need it, and not being afraid to put our needs first. Great participation    Assessment:    Stages of Change Model  Contemplation  Preparation/Determination   Client is cooperative, but appears to be ambivalent about need for lifestyle change.     Appears/Sounds:  Cooperative  Ambivalent    Plan:   Client will report in group therapy next week one thing he did over the break to practice emotional sobriety    PRAVIN Guerrier

## 2019-12-27 ENCOUNTER — OFFICE VISIT (OUTPATIENT)
Dept: FAMILY MEDICINE | Facility: CLINIC | Age: 54
End: 2019-12-27
Payer: MEDICAID

## 2019-12-27 VITALS
WEIGHT: 264.8 LBS | BODY MASS INDEX: 37.91 KG/M2 | TEMPERATURE: 98 F | HEART RATE: 67 BPM | HEIGHT: 70 IN | SYSTOLIC BLOOD PRESSURE: 170 MMHG | DIASTOLIC BLOOD PRESSURE: 100 MMHG | RESPIRATION RATE: 12 BRPM

## 2019-12-27 DIAGNOSIS — Z91.89 AT RISK FOR SLEEP APNEA: ICD-10-CM

## 2019-12-27 DIAGNOSIS — Z79.4 TYPE 2 DIABETES MELLITUS WITH OTHER SPECIFIED COMPLICATION, WITH LONG-TERM CURRENT USE OF INSULIN (H): Primary | ICD-10-CM

## 2019-12-27 DIAGNOSIS — E11.69 TYPE 2 DIABETES MELLITUS WITH OTHER SPECIFIED COMPLICATION, WITH LONG-TERM CURRENT USE OF INSULIN (H): Primary | ICD-10-CM

## 2019-12-27 DIAGNOSIS — E78.5 DYSLIPIDEMIA: ICD-10-CM

## 2019-12-27 DIAGNOSIS — R80.9 POSITIVE FOR MICROALBUMINURIA: ICD-10-CM

## 2019-12-27 DIAGNOSIS — J06.9 VIRAL URI WITH COUGH: ICD-10-CM

## 2019-12-27 DIAGNOSIS — E66.01 SEVERE OBESITY (BMI 35.0-39.9) WITH COMORBIDITY (H): ICD-10-CM

## 2019-12-27 DIAGNOSIS — I10 ESSENTIAL HYPERTENSION: ICD-10-CM

## 2019-12-27 PROCEDURE — 99214 OFFICE O/P EST MOD 30 MIN: CPT | Performed by: PHYSICIAN ASSISTANT

## 2019-12-27 RX ORDER — VALSARTAN 40 MG/1
40 TABLET ORAL DAILY
Qty: 30 TABLET | Refills: 0 | Status: SHIPPED | OUTPATIENT
Start: 2019-12-27

## 2019-12-27 RX ORDER — BENZONATATE 200 MG/1
200 CAPSULE ORAL 3 TIMES DAILY PRN
Qty: 21 CAPSULE | Refills: 0 | Status: SHIPPED | OUTPATIENT
Start: 2019-12-27

## 2019-12-27 ASSESSMENT — MIFFLIN-ST. JEOR: SCORE: 2042.37

## 2019-12-27 NOTE — PROGRESS NOTES
Subjective     Prieto Guzmán is a 54 year old male who presents to clinic today for the following health issues:    HPI   Diabetes Follow-up    How often are you checking your blood sugar? A few times a week  What time of day are you checking your blood sugars (select all that apply)?  Before meals  Have you had any blood sugars above 200?  No  Have you had any blood sugars below 70?  No    What symptoms do you notice when your blood sugar is low?  Lethargy and lightheaded    What concerns do you have today about your diabetes? None     Do you have any of these symptoms? (Select all that apply)  No numbness or tingling in feet.  No redness, sores or blisters on feet.  No complaints of excessive thirst.  No reports of blurry vision.  No significant changes to weight.     Have you had a diabetic eye exam in the last 12 months? No    BP Readings from Last 2 Encounters:   12/27/19 (!) 167/91   09/30/19 (!) 150/80     Hemoglobin A1C (%)   Date Value   09/30/2019 7.2 (H)   03/07/2019 7.9 (H)     LDL Cholesterol Calculated (mg/dL)   Date Value   03/07/2019 178 (H)       Diabetes Management Resources    Hypertension Follow-up      Do you check your blood pressure regularly outside of the clinic? Yes , once in a while    Are you following a low salt diet? No    Are your blood pressures ever more than 140 on the top number (systolic) OR more   than 90 on the bottom number (diastolic), for example 140/90? Yes      How many servings of fruits and vegetables do you eat daily?  2-3    On average, how many sweetened beverages do you drink each day (Examples: soda, juice, sweet tea, etc.  Do NOT count diet or artificially sweetened beverages)?   0  How many days per week do you miss taking your medication? 1    What makes it hard for you to take your medications?  remembering to take, usually on the weekends    DM2 - testing few times a week, usually 130s, mornings.  No insulin changes, using levemir and humalin R.    Up  "24 pounds since last visit - attributes to living conditions.  Occasionally goes to gym but much less active than in the past. No alcohol since June.  Still in treatment.  Not working.  [place]     HTN - checked a few times since last visit, 150-200/can't recall.  No chest pains, chest pressures, SOB or sudden change of endurance.   Intolerant of lisinopril, losartan.    * Cough-  Has had a cough for the past 2-3 weeks, did have cold symptoms at that time, but cough remains  Unchanged.  Nuisance in day.  More than a quick occasional cough.  Kicks up with the cold temps.  Still smoking, trying to cut back.  Smoking pipe - 1 pipe 3x per day.  Gets it every winter, goes away with spring.    BP Readings from Last 3 Encounters:   12/27/19 (!) 167/91   09/30/19 (!) 150/80   03/21/19 138/86    Wt Readings from Last 3 Encounters:   12/27/19 120.1 kg (264 lb 12.8 oz)   10/15/19 108.9 kg (240 lb)   09/30/19 116.6 kg (257 lb)        Reviewed and updated as needed this visit by Provider  Tobacco  Allergies  Meds  Problems  Med Hx  Surg Hx  Fam Hx         Review of Systems   ROS COMP: Constitutional, HEENT cardiovascular, pulmonary, GI, , musculoskeletal, neuro, skin, endocrine and psych systems are negative, except as otherwise noted.      Objective    BP (!) 167/91 (BP Location: Right arm, Patient Position: Chair, Cuff Size: Adult Large)   Pulse 67   Temp 98  F (36.7  C) (Tympanic)   Resp 12   Ht 1.778 m (5' 10\")   Wt 120.1 kg (264 lb 12.8 oz)   BMI 37.99 kg/m    Body mass index is 37.99 kg/m .  Physical Exam   GENERAL: healthy, alert and no distress  EYES: Eyes grossly normal to inspection, PERRL and conjunctivae and sclerae normal  HENT: ear canals and TM's normal, nose and mouth without ulcers or lesions  NECK: no adenopathy, no asymmetry, masses, or scars and thyroid normal to palpation  RESP: lungs clear to auscultation - no rales, rhonchi or wheezes  CV: " regular rate and rhythm, normal S1 S2, no S3 or S4, no murmur, click or rub      STOP BANG score 5-6    Diagnostic Test Results:  Labs reviewed in Epic        Assessment & Plan       ICD-10-CM    1. Type 2 diabetes mellitus with other specified complication, with long-term current use of insulin (H) E11.69 Lipid panel reflex to direct LDL Fasting    Z79.4 Hemoglobin A1c     aspirin (ASA) 81 MG tablet   2. Severe obesity (BMI 35.0-39.9) with comorbidity (H) E66.01    3. Essential hypertension I10 valsartan (DIOVAN) 40 MG tablet   4. Dyslipidemia E78.5 Lipid panel reflex to direct LDL Fasting   5. Positive for microalbuminuria R80.9    6. At risk for sleep apnea Z91.89 SLEEP EVALUATION & MANAGEMENT REFERRAL - [facility] [phone] (Age 13 and up if over 100 lbs)   7. Viral URI with cough J06.9 benzonatate (TESSALON) 200 MG capsule    B97.89      Tobacco Cessation:   reports that he has been smoking cigars and cigarettes. He has been smoking about 0.00 packs per day. His smokeless tobacco use includes snuff.  Tobacco Cessation Action Plan: Information offered: Patient not interested at this time    Patient Instructions   BP -  Much too high  Add valsartan   CALL to update me on home BPs or do nurse visit in next 1-2 weeks.  Goal under 130/80.  High risk sleep apnea, at some point see sleep specialist.  Perhaps after losing the weight if you plan to do so in near future.    Diabetes -  Lab only appt for after the 1st.  Blood and urine.  If cholesterol not better, will add   Add aspirin 81 mg daily.  Work on weight loss    Cough -  Optional prescription today  Try humidifier  If cough lasting more than 1-2 weeks more, see me    Do mail the poop test given today.          Return in about 3 months (around 3/27/2020) for diabetes.    Izzy Gao PA-C  [facility]

## 2019-12-27 NOTE — NURSING NOTE
"Chief Complaint   Patient presents with     Diabetes     Hypertension     Cough       Initial BP (!) 167/91 (BP Location: Right arm, Patient Position: Chair, Cuff Size: Adult Large)   Pulse 67   Temp 98  F (36.7  C) (Tympanic)   Resp 12   Ht 1.778 m (5' 10\")   Wt 120.1 kg (264 lb 12.8 oz)   BMI 37.99 kg/m   Estimated body mass index is 37.99 kg/m  as calculated from the following:    Height as of this encounter: 1.778 m (5' 10\").    Weight as of this encounter: 120.1 kg (264 lb 12.8 oz).    Patient presents to the clinic using No DME    Health Maintenance that is potentially due pending provider review:  NONE        Is there anyone who you would like to be able to receive your results? No  If yes have patient fill out RAYSA      "

## 2019-12-27 NOTE — PATIENT INSTRUCTIONS
BP -  Much too high  Add valsartan   CALL to update me on home BPs or do nurse visit in next 1-2 weeks.  Goal under 130/80.  High risk sleep apnea, at some point see sleep specialist.  Perhaps after losing the weight if you plan to do so in near future.    Diabetes -  Lab only appt for after the 1st.  Blood and urine.  If cholesterol not better, will add   Add aspirin 81 mg daily.  Work on weight loss    Cough -  Optional prescription today  Try humidifier  If cough lasting more than 1-2 weeks more, see me    Do mail the poop test given today.

## 2019-12-28 NOTE — ADDENDUM NOTE
Encounter addended by: Mallory Velez LADC on: 12/28/2019 10:17 AM   Actions taken: Charge Capture section accepted
Continue Regimen: Bactrim DS BID
Plan: Rx sent in with 11 refills per RR, pt to call if he wants us to change anything
Detail Level: Zone

## 2019-12-31 ENCOUNTER — HOSPITAL ENCOUNTER (OUTPATIENT)
Dept: BEHAVIORAL HEALTH | Facility: CLINIC | Age: 54
End: 2019-12-31
Attending: SOCIAL WORKER
Payer: MEDICAID

## 2019-12-31 PROCEDURE — H2035 A/D TX PROGRAM, PER HOUR: HCPCS | Mod: HQ

## 2019-12-31 NOTE — PROGRESS NOTES
Prieto SHELDON Daniel  [id number]               Adult CD Progress Note and Treatment Plan Review     Attendance     Tuesday   Group Date: 12/31/19   Group Attendance Attended group session   Group Therapy Type Psychoeducation and Psychotherapeutic   Group Topic Covered Happiness   Client's Response To Group Topic Cooperative with task   Client Group Participation Detail Adequate participation   Group Attendance (Time) 3.0 Hours   Individual Attendance None   Family Attendance None   Other Comments/Information None       Total # of Phase 1 Group Sessions: 9     Total # of Phase 2 Group Sessions: NA  Total # of Phase 3 Group Sessions: NA  Total # of 1:1 Sessions: 1    Support group attended this week: no    Reporting sobriety: Yes    Treatment Plan Review     Treatment Plan Review completed on:  12/31/2019     Projected discharge date: 4/11/20    Client preferred learning style: Hands on    Staff member(s) contributing: Mallory GAGE, [facility]     Received supervision: No    Client involvement with treatment planning: contributed to goals and plan.    Client received copy of plan/revised plan: Yes    Client agrees with plan/revised plan: Yes    Changes to Treatment Plan: No    Client's Dimension 1 Goal(s) Prieto will develop effective strategies to maintain abstinence and manage symptoms of PAWS   See below.   Client's Dimension 2 Goal(s) Prieto will improve and maintain healthy self care habits Goal not addressed this week.   Client's Dimension 3 Goal(s) Pending See Mental Health Note   Client's Dimension 4 Goal(s) Prieto will be able to verbalize the need for support from this outpatient program  Prieto will develop intrinsic motivation to maintain recovery efforts See below.   Client's Dimension 5 Goal(s) Identify personal patterns of relapse and self sabotage  Goal not addressed this week.   Client's Dimension 6 Goal(s) Build a sober support network, celebrate accomplishments, and develop/maintain a safe  "living environment  Remain compliant with conditions of probation See below.     New Goals added since last review: None.    Goal(s) worked on since last review:   Prieto will be able to verbalize the need for support from this outpatient program  Build a sober support network, celebrate accomplishments, and develop/maintain a safe living environment    Strategies effective: Yes    Treatment Coordination Activities: None.    Medical, Mental Health and other appointments the client attended: None  Medication issues: None.    Physical and mental health problems: None.    Review and evaluation of the individual abuse prevention plan: The program's abuse prevention plan (ART) is sufficient for this client.     Substance Use Disorders:    303.90 (F10.20) Alcohol Use Disorder Severe    ASAM Risk Rating:     Dimension 1 0 No change    Dimension 2 0 No Change    Dimension 3 2 No Change    Dimension 4 2 No Change    Dimension 5 3 No Change      Dimension 6 2 No Change    Data:   Client reports he is struggling last week because the holidays are being triggered by memories of drinking \"Hot Toddies\" with friends, and those friends have been calling him to get together. Client reports he dealt with his cravings by distracting himself with other tasks. Client reports he continues to be frustrated with his girlfrend's grown sons because they \"wont get off their butts and help out\" Client also reports feeling frustrated that he is not working and [place]. Client reports his kids are still not talking to him but he has started communicating with his brother again and he \"wants to remain on good terms\" with him. Client reports he went cross country skiing and connected with nature as his practice of emotional sobriety from last week. Client was unable to attend a meeting last week due to weather. Client reports he is anxious about his upcoming court date next week    Intervention:     Tuesday - " Today we discussed three types of happiness and why it is important to understand the difference between the three. We reviewed the following:  Pleasure:  Pleasure is important to us as human beings, but its related benefits are relatively short term.  A delicious meal, a nice meeting with friends or listening to our favorite music all produce endorphins and offer us happy moments that can be the highlights of our day.  However, these singular moments don t equate to long-term satisfaction.  Passion:  Being passionate about something whether a hobby, cause or group activity and actively being involved with it can be a great source of happiness.  These activities deliver contentment in the medium term and provide a sense of achievement and variety in life.  Purpose:  Feeling like you are part of something bigger than yourself and that your actions really matter can lead to a deep sense of fulfillment and long-term happiness.  We all like to think that our lives have meaning and if you can find your  calling  or be part of a great team, group or organization that are making a difference then chances are you will be truly happy.  Client then completed the [place] Happiness questionnaire to gain a sense of how  happy  they are, and ways to increase happiness in life. One way to increase happiness is through gratitude and acknowledging things we are grateful for each day    Assessment:    Stages of Change Model  Contemplation  Preparation/Determination   Client is cooperative, but appears to be ambivalent about need for lifestyle change.     Appears/Sounds:  Cooperative  Ambivalent    Plan:   Client will record his gratitude's daily over the weekend and report his experience with this in group therapy next week    Mallory Velez [facility]

## 2020-01-03 DIAGNOSIS — Z12.11 SPECIAL SCREENING FOR MALIGNANT NEOPLASMS, COLON: ICD-10-CM

## 2020-01-03 PROCEDURE — 82274 ASSAY TEST FOR BLOOD FECAL: CPT | Performed by: PHYSICIAN ASSISTANT

## 2020-01-03 NOTE — LETTER
January 6, 2020      Prieto Sanchezpeewee  [id number] [id number]        Dear ,    We are writing to inform you of your test results.    Your test for blood in stool, a screen for colon cancer, was normal.  Repeat test yearly until age 75.    Resulted Orders   Fecal colorectal cancer screen (FIT)   Result Value Ref Range    Occult Blood Scn FIT Negative NEG^Negative       If you have any questions or concerns, please call the clinic at the number listed above.       Sincerely,        Izzy Gao PA-C

## 2020-01-05 LAB — HEMOCCULT STL QL IA: NEGATIVE

## 2020-01-06 NOTE — RESULT ENCOUNTER NOTE
Dear Prieto,    Your test for blood in stool, a screen for colon cancer, was normal.  Repeat test yearly until age 75.    Please contact me with any questions.    Izzy

## 2020-01-13 ENCOUNTER — HOSPITAL ENCOUNTER (OUTPATIENT)
Dept: BEHAVIORAL HEALTH | Facility: CLINIC | Age: 55
End: 2020-01-13
Attending: SOCIAL WORKER
Payer: COMMERCIAL

## 2020-01-13 PROCEDURE — 80320 DRUG SCREEN QUANTALCOHOLS: CPT

## 2020-01-13 PROCEDURE — 80307 DRUG TEST PRSMV CHEM ANLYZR: CPT

## 2020-01-13 PROCEDURE — H2035 A/D TX PROGRAM, PER HOUR: HCPCS | Mod: HQ

## 2020-01-13 NOTE — PROGRESS NOTES
Skills Group Progress Note   Name: Prieto Guzmán  MR#: [id number]     Monday    Group Date: 1/13/2020   Group Attendance Attended group session   Group Therapy Type Psychoeducation and Psychotherapeutic   Group Topic Covered Mental Health Skills   Client's Response To Group Topic Cooperative with task   Client Group Participation Detail Adequate participation   Group Attendance (Time) 3.0 Hours   Individual Attendance None   Family Attendance None   Other Comments/Information None      Total # of Mental Health Group Sessions: 3  Total # of Mental Health Individual Sessions: 1    DIMENSION 3:  Emotional/Behavioral/Cognitive Conditions and Complications  The degree to which any condition or complications are likely to interfere with treatment for substance abuse or with function in significant life areas and the likelihood of risk of harm to self or others.     Emotional/Behavioral - Current Risk Factor:  2    DSM-5 Diagnoses:   Client meets criteria for SADIQ     Goal(s):   Pending    Client reports taking the following medications; hydroxyzine    Current Outpatient Medications   Medication     alcohol swab prep pads     amLODIPine (NORVASC) 10 MG tablet     aspirin (ASA) 81 MG tablet     atorvastatin (LIPITOR) 20 MG tablet     benzonatate (TESSALON) 200 MG capsule     blood glucose (NO BRAND SPECIFIED) test strip     blood glucose calibration (NO BRAND SPECIFIED) solution     insulin detemir (LEVEMIR VIAL) 100 UNIT/ML vial     insulin regular (HUMULIN R/NOVOLIN R VIAL) 100 UNIT/ML vial     thin (NO BRAND SPECIFIED) lancets     valsartan (DIOVAN) 40 MG tablet     No current facility-administered medications for this encounter.        Latest PHQ-9: Client scored 8 and indicated the symptoms are; very difficult to their daily living.  Latest SADIQ-7: Client scored 11 and indicated the symptoms are; somewhat difficult to their daily living.    Data: Client meets criteria for Generalized Anxiety Disorder based on  "his DA with Fang Mcdowell MA, [facility], [facility] on 12/3/2019. Client rates his anxiety at an 8 on a scale of 1 - 10 with 10 being the highest. Client states his anxiety is around \"home life and family\". He talked about how his daughter whom he is estranged from sent him a letter about how mad she is and how toxic he is to her life. He also talked about how the closing on his house in [place] got moved out. He talked about how court went last week and was able to provide documentation this week as to confirming these court hearings. He was reminded he needs to provide this information prior to being gone from group. Client's protective factors include, group, girlfriend. Client's potential risk factors include; lack of willingness, lack of healthy coping strategies and lack of sober support network. Client denies any suicidal thought, plan or intent this week. Client also denies any thoughts or behaviors of self harm this week.     Intervention:   This week we talked about the causes, cures, and early warning signs for mental health issues. We talked about the benefits and importance of positive mental health. We talked about what mental health means and addressed the emotional, physical and psychological wellbeing of any individual. I addressed the various treatment options and what constitutes the care team when seeking/receiving help from professionals. I talked with client about the importance of being honest about symptoms, progress, struggles and trying new coping strategies to minimize the symptoms.    Assessment:      Client is currently in the pre-contemplative stage of change. Client was challenged to consider if he may have a problem. We continue to explore this by talking about signs and symptoms he has experienced as it relates to mental health and how his chemical use impacts these symptoms.     Client presents externally motivated. He seems to be evasive and doesn't share but on a surface level. It seems " as though he is always leaving parts of the story out.     Plan:   Client will continue with individual therapy as recommended by therapist.   Client will attend weekly support meetings.   Client will obtain sponsorship.  Client will verbalize understanding of the importance of overall mental wellness. We will continue to address mental wellness next week.        Fang Mcdowell MA, LADC, [facility]  Licensed Psychotherapist

## 2020-01-14 ENCOUNTER — HOSPITAL ENCOUNTER (OUTPATIENT)
Dept: BEHAVIORAL HEALTH | Facility: CLINIC | Age: 55
End: 2020-01-14
Attending: SOCIAL WORKER
Payer: COMMERCIAL

## 2020-01-14 LAB — ETHYL GLUCURONIDE UR QL: NEGATIVE

## 2020-01-14 PROCEDURE — H2035 A/D TX PROGRAM, PER HOUR: HCPCS | Mod: HQ

## 2020-01-15 ENCOUNTER — HOSPITAL ENCOUNTER (OUTPATIENT)
Dept: BEHAVIORAL HEALTH | Facility: CLINIC | Age: 55
End: 2020-01-15
Attending: SOCIAL WORKER
Payer: COMMERCIAL

## 2020-01-15 PROCEDURE — H2035 A/D TX PROGRAM, PER HOUR: HCPCS | Mod: HQ

## 2020-01-15 NOTE — PROGRESS NOTES
"Prieto Guzmán  [id number]                                             Adult CD Progress Note      Attendance     Tuesday   Group Date: 1/14/2020   Group Attendance Attended group session   Group Therapy Type Addiction and Life skill(s)   Group Topic Covered Refusal Skills   Client's Response To Group Topic Cooperative with task   Client Group Participation Detail Shows interest and Adequate participation   Group Attendance (Time) 3.0 Hours   Individual Attendance None   Family Attendance None   Other Comments/Information None       Data:   client did actively participate  Client participated in roll playing scenarios demonstrating refusal skills. Client worked with his peers to create and present different ways to refuse offers of use when faced with high risk situations    Intervention:   Counselor feedback  Group feedback  Counselor provided information and education related to effective ways to refuse use when faced with different situations which could include friends, family, and suppliers. Counselor distributed scenarios to role play and then challenged each client to create their own scenario and present to their peers. Clients were tasked with creating a \"Go To\" line when faced with an unexpected offer for use such as \"No thanks, I don't use anymore\"    Assessment:    Stages of Change Model  Contemplation  Preparation/Determination    Appears/Sounds:  Cooperative  Engaged  Client actively participated in entire group and appeared to be engaged with peers  Plan:   Client will attend at least one support group and continue working on \"go to\" line for refusal    "

## 2020-01-15 NOTE — PROGRESS NOTES
"Prieto Guzmán  [id number]                                             Adult CD Progress Note      Attendance     Wednesday   Group Date: 1/15/2020   Group Attendance Attended group session   Group Therapy Type Addiction and Life skill(s)   Group Topic Covered Documentary on Teddy's perspective of mental illness   Client's Response To Group Topic Cooperative with task   Client Group Participation Detail Shows interest and Adequate participation   Group Attendance (Time) 3.0 Hours   Individual Attendance None   Family Attendance None   Other Comments/Information None       Data:   client did actively participate  Client participated in reading/reviewing study guide questions prior to the viewing the documentary. Client participated and discussed personal opinions of the documentary \"I AM\".     Intervention:   Counselor feedback  Group feedback    The last 25 five minutes of group client completed with peers the questions on handout and shared one question with us that stood out for him during the exercise.    Assessment:    Stages of Change Model  Contemplation  Preparation/Determination    Appears/Sounds:  Cooperative  Engaged  Client actively participated in entire group and appeared to be engaged with peers  Plan:   Client continue with treatment plan goals    "

## 2020-01-16 NOTE — ADDENDUM NOTE
Encounter addended by: Mallory Velez [facility] on: 1/16/2020 9:04 AM   Actions taken: Clinical Note Signed

## 2020-01-16 NOTE — PROGRESS NOTES
Prieto Guzmán  [id number]           Treatment Plan Review     Total # of Phase 1 Group Sessions: 11                                Total # of Phase 2 Group Sessions: NA  Total # of Phase 3 Group Sessions: NA  Total # of 1:1 Sessions: 1     Support group attended this week: yes     Reporting sobriety: Yes    Treatment Plan Review completed on:  1/14/2020     Projected discharge date: 4/11/20     Client preferred learning style: Hands on     Staff member(s) contributing: Mallory Velez BS, [facility] [place]      Received supervision: No     Client involvement with treatment planning: contributed to goals and plan.     Client received copy of plan/revised plan: Yes     Client agrees with plan/revised plan: Yes     Changes to Treatment Plan: No     Client's Dimension 1 Goal(s) Prieto will develop effective strategies to maintain abstinence and manage symptoms of PAWS    See below.   Client's Dimension 2 Goal(s) Prieto will improve and maintain healthy self care habits Goal not addressed this week.   Client's Dimension 3 Goal(s) Pending See Mental Health Note   Client's Dimension 4 Goal(s) Prieto will be able to verbalize the need for support from this outpatient program  Prieto will develop intrinsic motivation to maintain recovery efforts See below.   Client's Dimension 5 Goal(s) Identify personal patterns of relapse and self sabotage  Goal not addressed this week.   Client's Dimension 6 Goal(s) Build a sober support network, celebrate accomplishments, and develop/maintain a safe living environment  Remain compliant with conditions of probation See below.      New Goals added since last review: None.     Goal(s) worked on since last review:   Prieto will be able to verbalize the need for support from this outpatient program  Build a sober support network, celebrate accomplishments, and develop/maintain a safe living environment   Remain compliant with conditions of probation    Strategies effective: Yes     Treatment  Coordination Activities: None.     Medical, Mental Health and other appointments the client attended: None  Medication issues: None.     Physical and mental health problems: None.     Review and evaluation of the individual abuse prevention plan: The program's abuse prevention plan (ART) is sufficient for this client.      Substance Use Disorders:    303.90 (F10.20) Alcohol Use Disorder Severe     ASAM Risk Rating:     Dimension 1 0 No change    Dimension 2 0 No Change    Dimension 3 2 No Change    Dimension 4 2 No Change    Dimension 5 3 No Change      Dimension 6 2 No Change     Mallory Velez BS, [facility]

## 2020-01-20 ENCOUNTER — HOSPITAL ENCOUNTER (OUTPATIENT)
Dept: BEHAVIORAL HEALTH | Facility: CLINIC | Age: 55
End: 2020-01-20
Attending: SOCIAL WORKER
Payer: COMMERCIAL

## 2020-01-20 PROCEDURE — H2035 A/D TX PROGRAM, PER HOUR: HCPCS | Mod: HQ

## 2020-01-20 PROCEDURE — 80307 DRUG TEST PRSMV CHEM ANLYZR: CPT

## 2020-01-20 PROCEDURE — 80320 DRUG SCREEN QUANTALCOHOLS: CPT

## 2020-01-20 NOTE — PROGRESS NOTES
Skills Group Progress Note   Name: Prieto Guzmán  MR#: [id number]     Monday    Group Date: 1/20/2020   Group Attendance Attended group session   Group Therapy Type Psychoeducation and Psychotherapeutic   Group Topic Covered Mental Health Skills   Client's Response To Group Topic Cooperative with task   Client Group Participation Detail Adequate participation   Group Attendance (Time) 3.0 Hours   Individual Attendance None   Family Attendance None   Other Comments/Information None      Total # of Mental Health Group Sessions: 4  Total # of Mental Health Individual Sessions: 2    DIMENSION 3:  Emotional/Behavioral/Cognitive Conditions and Complications  The degree to which any condition or complications are likely to interfere with treatment for substance abuse or with function in significant life areas and the likelihood of risk of harm to self or others.     Emotional/Behavioral - Current Risk Factor:  2    DSM-5 Diagnoses:   Client meets criteria for SADIQ     Goal(s):   Pending    Client reports taking the following medications; hydroxyzine    Current Outpatient Medications   Medication     alcohol swab prep pads     amLODIPine (NORVASC) 10 MG tablet     aspirin (ASA) 81 MG tablet     atorvastatin (LIPITOR) 20 MG tablet     benzonatate (TESSALON) 200 MG capsule     blood glucose (NO BRAND SPECIFIED) test strip     blood glucose calibration (NO BRAND SPECIFIED) solution     insulin detemir (LEVEMIR VIAL) 100 UNIT/ML vial     insulin regular (HUMULIN R/NOVOLIN R VIAL) 100 UNIT/ML vial     thin (NO BRAND SPECIFIED) lancets     valsartan (DIOVAN) 40 MG tablet     No current facility-administered medications for this encounter.        Latest PHQ-9: Client scored 8 and indicated the symptoms are; very difficult to their daily living.  Latest SADIQ-7: Client scored 13 and indicated the symptoms are; somewhat difficult to their daily living.    Data: Client meets criteria for Generalized Anxiety Disorder based on  his DA with Fang Mcdowell MA, [facility], [facility] on 12/3/2019. Client rates his anxiety at an 6 on a scale of 1 - 10 with 10 being the highest. Client talked about his struggles with the weather, home life, family, and probation. He admits these areas all cause extra stress however is not certain of a solution. Client's protective factors include, group, routine, activities. Client's potential risk factors include; lack of willingness, lack of healthy coping strategies and lack of sober support network. Client denies any suicidal thought, plan or intent this week. Client also denies any thoughts or behaviors of self harm this week.     Intervention:   This week we talked about the neurobiology of addiction and mental health as well as healthy coping strategies to help achieve a balanced lifestyle.  Client was exposed to basic education and information for the neurobiology of addiction and mental health. We also reviewed the importance of implementing positive coping strategies to help create balance in their life. Client was asked to participate in a group interactive activity to outline numerous different coping strategies and asked to pick one each day to implement in their own life.       Assessment:    Client is currently in the pre-contemplative stage of change. Client was challenged to consider if he may have a problem. We continue to explore this by talking about signs and symptoms he has experienced as it relates to mental health and how his chemical use impacts these symptoms.     Client presents externally motivated. Client seems to minimize the impact his chemical use and mental health has on his day to day life.     Plan:   Client will continue with individual therapy as recommended by therapist.   Client will attend weekly support meetings.   Client will obtain sponsorship.  Client will verbalize understanding of the importance of overall mental wellness.   Client will practice one coping strategies daily  that we identified in group this week.       Fang Mcdowell MA, LADC, LPCC  Licensed Psychotherapist

## 2020-01-21 ENCOUNTER — HOSPITAL ENCOUNTER (OUTPATIENT)
Dept: BEHAVIORAL HEALTH | Facility: CLINIC | Age: 55
End: 2020-01-21
Attending: SOCIAL WORKER
Payer: COMMERCIAL

## 2020-01-21 PROCEDURE — H2035 A/D TX PROGRAM, PER HOUR: HCPCS | Mod: HQ

## 2020-01-21 PROCEDURE — H2035 A/D TX PROGRAM, PER HOUR: HCPCS

## 2020-01-21 NOTE — ADDENDUM NOTE
[facility] on: 1/21/2020 1:54 PM   Actions taken: Visit Navigator Flowsheet section accepted

## 2020-01-21 NOTE — PROGRESS NOTES
"Prieto Guzmán  [id number]                                             Adult CD Progress Note       Attendance                                    Tuesday                               Group Date: 01/21/2020   Group Attendance Attended group session   Group Therapy Type Addiction and Life skill(s)   Group Topic Covered Balanced Lifestyle and Time Management   Client's Response To Group Topic Cooperative with task   Client Group Participation Detail Shows interest and Adequate participation   Group Attendance (Time) 3.0 Hours   Individual Attendance None   Family Attendance None   Other Comments/Information None         Data:   client did actively participate  Client was present and participated in group     Intervention:   Group feedback  Twelve Step facilitation  Discussion on the difference between \"Powerlessness\" and \"Helplessness\" and the definition of both  We also has a guest speaker share his story     Assessment:    Stages of Change Model  Preparation/Determination   Client submitted UA on 1/20/2020 which was negative for illicit substances. Client appears to be engaged in discussions and shares openly in group.        Plan:   -Client will continue to work on treatment plan objectives.     PRAVIN Guerrier    "

## 2020-01-21 NOTE — PROGRESS NOTES
INDIVIDUAL THERAPY NOTE  TIME: 08:00am  Duration: 31 Minutes    Data:  Client talked about how stressful his life is with the pending house purchase, waiting on what county his probation is going to be assigned, pending court in March to finalize pending charge, and the relationship with his girlfriend.       Intervention: We talked about his day to day stress and what he wants to do regarding solutions. Client talks about the problems in his life and lacks willingness to make choices and find solutions. He struggles to answer any questions regarding making decisions or working towards a solutions. Client talks about everything that isn't working. He doesn't seem to take on a lot of personal responsibility for the situation he is in. We talked about the challenges in his life, his relationship and the complications of the pending upcoming move to [facility].       Assessment: Client seems to lack willingness to implement changes and tends to focus more time on the problem then any type of solution.       Plan: Client and I will continue with weekly therapy sessions in addition to his group sessions. Client and I will continue to process what changes he is willing to make in his life.       Fang Mcdowell MA, LADC, LPCC

## 2020-01-22 ENCOUNTER — HOSPITAL ENCOUNTER (OUTPATIENT)
Dept: BEHAVIORAL HEALTH | Facility: CLINIC | Age: 55
End: 2020-01-22
Attending: SOCIAL WORKER
Payer: COMMERCIAL

## 2020-01-22 PROCEDURE — H2035 A/D TX PROGRAM, PER HOUR: HCPCS

## 2020-01-22 PROCEDURE — H2035 A/D TX PROGRAM, PER HOUR: HCPCS | Mod: HQ

## 2020-01-22 NOTE — PROGRESS NOTES
Prieto SHELDON Averymaricelpeewee  [id number]                                             Adult CD Progress Note      Attendance     Wednesday   Group Date: 1/22/2020   Group Attendance Attended group session   Group Therapy Type Addiction   Group Topic Covered Emotions/Expression/Feelings   Client's Response To Group Topic Cooperative with task Listened actively   Client Group Participation Detail Highly involved   Group Attendance (Time) 3.0 Hours   Individual Attendance None   Family Attendance None   Other Comments/Information None       Data:   Client was engaged and participated in group discussion and activity. Through painting on canvas, client was asked to create the emotions of addiction and recovery through color chose, lines, designs, and words. Client then shared finish canvas and explained his creation of emotions including why he chose certain colors, designs, and words. An additional benefit of this activity was to expose clients to alternative ways of expressing feelings and emotions in general        PRAVIN Guerrier

## 2020-01-22 NOTE — PROGRESS NOTES
"INDIVIDUAL SESSION NOTE - 1/22/2020    Data:   Met with Client for one hour. Client talked a lot about his girlfriend and the possibility the relationship may not survive [place] Client talked about his building resentments toward his girlfriend's adult special needs sons because there does not seem to be much quality time for the relationship as a couple. Client talked about the break up of his marriage and the divorce he is still \"dealing\" with. Client reports the last time he heard from his adult daughter, she told him he was \"toxic\" and this upsets him. Client reports having a lot going on and this \"really triggers\" him. Client also reports he would like to send a greeting card and a letter to his daughter \"explaining\" the situation    Intervention:   I empathized with the different issues client feels he is currently dealing with. We talked about how it would look if he and his girlfriend broke up and where he would find support so he didn't drink. We also talked about the anger his adult children may be feeling and what he hopes to accomplish by writing a letter.    Assessment:  Client appears open and honest about his feelings and spoke freely about both the positive and negative feelings he is currently experiencing. Client continues to struggle not to place blame on others for his use, but appears to understand that blaming is not the answer     Plan:   Client will write a letter to his daughter in the next week, and before sending it, will review with counselor to process what he wrote.  "

## 2020-01-23 NOTE — ADDENDUM NOTE
Encounter addended by: Mallory Velez [facility] on: 1/23/2020 10:32 AM   Actions taken: Episode edited, Pend clinical note

## 2020-01-23 NOTE — PROGRESS NOTES
"Prieto Guzmán  [id number]           Treatment Plan Review     Total # of Phase 1 Group Sessions: 13                                Total # of Phase 2 Group Sessions: NA  Total # of Phase 3 Group Sessions: NA  Total # of 1:1 Sessions: 2     Support group attended this week: yes     Reporting sobriety: Yes    Treatment Plan Review completed on:  1/22/2020     Projected discharge date: 4/11/20     Client preferred learning style: Hands on     Staff member(s) contributing: Mallory Velez BS, [facility]      Received supervision: No     Client involvement with treatment planning: contributed to goals and plan.     Client received copy of plan/revised plan: Yes     Client agrees with plan/revised plan: Yes     Changes to Treatment Plan: No     Client's Dimension 1 Goal(s) Prieto will develop effective strategies to maintain abstinence and manage symptoms of PAWS    Completed assignment   Client's Dimension 2 Goal(s) Prieto will improve and maintain healthy self care habits Goal not addressed this week.   Client's Dimension 3 Goal(s) Prieto will reduce his SADIQ-7 score from a 14 to a 7 See Mental Health Note   Client's Dimension 4 Goal(s) Prieto will be able to verbalize the need for support from this outpatient program  Prieto will develop intrinsic motivation to maintain recovery efforts See below.   Client's Dimension 5 Goal(s) Identify personal patterns of relapse and self sabotage  See below   Client's Dimension 6 Goal(s) Build a sober support network, celebrate accomplishments, and develop/maintain a safe living environment  Remain compliant with conditions of probation See below.      New Goals added since last review: None.     Goal(s) worked on since last review:   Prieto will be able to verbalize the need for support from this outpatient program  Client reports he is thankful for group. He reports it helps him with his anxiety and \"staying healthy\"  Client reports his motivation isn't \"where it should " "be\" because he has been having trouble sleeping due to anxiety over court, moving, and his relationship.    Build a sober support network, celebrate accomplishments, and develop/maintain a safe living environment  Client is attending outside support groups and speaks to his \"mentor\" at least once a week    Remain compliant with conditions of probation  Client and counselor spoke with probation during individual. Client has probation meeting Friday, January 24 at 1:00 PM in [facility]    Prieto will develop effective strategies to maintain abstinence and manage symptoms of PAWS  Client completed the reading he was unsigned covering PAWS and is able to verbalize memory issues and anxiety as possible PAWS symptoms he struggles with at this time. Client reports he spends time in the garage working on \"things\" as a way of coping with anxiety and reads treatment material to \"keep my brain active\"     Strategies effective: Yes     Treatment Coordination Activities: None.     Medical, Mental Health and other appointments the client attended: None  Medication issues: None.     Physical and mental health problems: None.     Review and evaluation of the individual abuse prevention plan: The program's abuse prevention plan (ART) is sufficient for this client.      Substance Use Disorders:    303.90 (F10.20) Alcohol Use Disorder Severe     ASAM Risk Rating:     Dimension 1 0 No change    Dimension 2 0 No Change    Dimension 3 2 No Change    Dimension 4 2 No Change    Dimension 5 3 No Change      Dimension 6 2 No Change     Mallory GAGE, [facility]    "

## 2020-01-27 ENCOUNTER — HOSPITAL ENCOUNTER (OUTPATIENT)
Dept: BEHAVIORAL HEALTH | Facility: CLINIC | Age: 55
End: 2020-01-27
Attending: SOCIAL WORKER
Payer: COMMERCIAL

## 2020-01-27 PROCEDURE — 80320 DRUG SCREEN QUANTALCOHOLS: CPT

## 2020-01-27 PROCEDURE — 80307 DRUG TEST PRSMV CHEM ANLYZR: CPT

## 2020-01-27 PROCEDURE — H2035 A/D TX PROGRAM, PER HOUR: HCPCS | Mod: HQ

## 2020-01-27 ASSESSMENT — PATIENT HEALTH QUESTIONNAIRE - PHQ9
5. POOR APPETITE OR OVEREATING: SEVERAL DAYS
SUM OF ALL RESPONSES TO PHQ QUESTIONS 1-9: 11

## 2020-01-27 ASSESSMENT — ANXIETY QUESTIONNAIRES
6. BECOMING EASILY ANNOYED OR IRRITABLE: MORE THAN HALF THE DAYS
2. NOT BEING ABLE TO STOP OR CONTROL WORRYING: MORE THAN HALF THE DAYS
GAD7 TOTAL SCORE: 12
7. FEELING AFRAID AS IF SOMETHING AWFUL MIGHT HAPPEN: MORE THAN HALF THE DAYS
5. BEING SO RESTLESS THAT IT IS HARD TO SIT STILL: MORE THAN HALF THE DAYS
3. WORRYING TOO MUCH ABOUT DIFFERENT THINGS: MORE THAN HALF THE DAYS
1. FEELING NERVOUS, ANXIOUS, OR ON EDGE: SEVERAL DAYS
IF YOU CHECKED OFF ANY PROBLEMS ON THIS QUESTIONNAIRE, HOW DIFFICULT HAVE THESE PROBLEMS MADE IT FOR YOU TO DO YOUR WORK, TAKE CARE OF THINGS AT HOME, OR GET ALONG WITH OTHER PEOPLE: SOMEWHAT DIFFICULT

## 2020-01-27 NOTE — PROGRESS NOTES
Skills Group Progress Note   Name: Prieto Guzmán  MR#: [id number]     Monday    Group Date: 1/27/2020   Group Attendance Attended group session   Group Therapy Type Psychoeducation and Psychotherapeutic   Group Topic Covered Communication   Client's Response To Group Topic Cooperative with task   Client Group Participation Detail Adequate participation   Group Attendance (Time) 3.0 Hours   Individual Attendance None   Family Attendance None   Other Comments/Information None      Total # of Mental Health Group Sessions: 5  Total # of Mental Health Individual Sessions: 4    DIMENSION 3:  Emotional/Behavioral/Cognitive Conditions and Complications  The degree to which any condition or complications are likely to interfere with treatment for substance abuse or with function in significant life areas and the likelihood of risk of harm to self or others.     Emotional/Behavioral - Current Risk Factor:  2    DSM-5 Diagnoses:   Client meets criteria for SADIQ     Goal(s):   Pending    Client reports taking the following medications; hydroxyzine    Current Outpatient Medications   Medication     alcohol swab prep pads     amLODIPine (NORVASC) 10 MG tablet     aspirin (ASA) 81 MG tablet     atorvastatin (LIPITOR) 20 MG tablet     benzonatate (TESSALON) 200 MG capsule     blood glucose (NO BRAND SPECIFIED) test strip     blood glucose calibration (NO BRAND SPECIFIED) solution     insulin detemir (LEVEMIR VIAL) 100 UNIT/ML vial     insulin regular (HUMULIN R/NOVOLIN R VIAL) 100 UNIT/ML vial     thin (NO BRAND SPECIFIED) lancets     valsartan (DIOVAN) 40 MG tablet     No current facility-administered medications for this encounter.        Latest PHQ-9: Client scored 8 and indicated the symptoms are; very difficult to their daily living.  Latest SADIQ-7: Client scored 13 and indicated the symptoms are; somewhat difficult to their daily living.    Data: Client meets criteria for Generalized Anxiety Disorder based on his DA  "with Fang Mcdowell MA, [facility], [facility] on 12/3/2019. Client rates his anxiety at an 6 on a scale of 1 - 10 with 10 being the highest. Client states that \"probation and home life\" are causing him anxiety. Client's protective factors include, group, routine, activities. Client's potential risk factors include; lack of willingness, lack of healthy coping strategies and lack of sober support network. Client denies any suicidal thought, plan or intent this week. Client also denies any thoughts or behaviors of self harm this week.     Intervention:   This week we talked about communication skills. We reviewed the behaviors included in communication like; eye contact, reflective listening, visual aspects, facial expressions, body language and various other behaviors or characteristics. Clients were asked to complete a back to back exercise as a way to demonstrate some of the challenges in communicating. We talked about what makes communication more effective and ineffective. Finally, clients were asked to pair up and practice healthy communication based on the characteristics and behaviors discussed today.     Client also submitted to a UA and breathalyzer; results were positive for the breathalyzer at .016 however came back negative from the lab. Client was educated last week on the safe over the counter medications and instructed to find cough drops that do not have alcohol in them and despite this education he continues to use cough drops with alcohol in them which may be the reason his breathalyzer shows up positive.      Assessment:    Client is currently in the pre-contemplative stage of change. Client was challenged to consider if he may have a problem. We continue to explore this by talking about healthy communication skills and how this ties in to changes when we are using or when we are not managing our mental health symptoms.     Client is compliant with group expectations. He presents on a surface level and " doesn't seem interested in working towards change.     Plan:   Client will continue with individual therapy as recommended by therapist.   Client will attend weekly support meetings.   Client will obtain sponsorship.  Client will be mindful of healthy and unhealthy communication behaviors they use and we will discuss specific strategies for assertive communication next week.       Fang Mcdowell MA, [facility], LP[facility]  Licensed Psychotherapist

## 2020-01-28 ENCOUNTER — HOSPITAL ENCOUNTER (OUTPATIENT)
Dept: BEHAVIORAL HEALTH | Facility: CLINIC | Age: 55
End: 2020-01-28
Attending: SOCIAL WORKER
Payer: COMMERCIAL

## 2020-01-28 LAB — ETHYL GLUCURONIDE UR QL: NEGATIVE

## 2020-01-28 PROCEDURE — H2035 A/D TX PROGRAM, PER HOUR: HCPCS

## 2020-01-28 PROCEDURE — H2035 A/D TX PROGRAM, PER HOUR: HCPCS | Mod: HQ

## 2020-01-28 ASSESSMENT — ANXIETY QUESTIONNAIRES: GAD7 TOTAL SCORE: 12

## 2020-01-28 NOTE — ADDENDUM NOTE
Encounter addended by: Fang Mcdowell [facility] on: 1/28/2020 5:21 PM   Actions taken: Visit Navigator Flowsheet section accepted

## 2020-01-28 NOTE — PROGRESS NOTES
Prieto SHELDON Averymaricelpeewee  [id number]                                             Adult CD Progress Note       Attendance      Tuesday   Group Date: 1/28/2020   Group Attendance Attended group session   Group Therapy Type Psychoeducation and Psychotherapeutic   Group Topic Covered Communication   Client's Response To Group Topic Cooperative with task Listened actively   Client Group Participation Detail Shows interest and Adequate participation   Group Attendance (Time) 3.0 Hours   Individual Attendance None   Family Attendance None   Other Comments/Information None       Data:   Clients were provided education on communication styles including aggressive, passive, and assertive. Clients participated in short assessment to determine which communication style they use and then were asked to share with group. Clients were introduced to the assertive communication and then participated in role playing different scenarios in which they would use the formula. The benefits of learning to communicate assertively include owning your feelings and expressing how you feel without blaming others. Client was very engaged in group and participated in role playing exercises. Client appears to understand the benefits of assertive communication and how to use the formula correctly        PRAVIN Guerrier

## 2020-01-28 NOTE — PROGRESS NOTES
INDIVIDUAL THERAPY NOTE  TIME: 8:00am  Duration: 1 Hour    Data: Client talked about ongoing struggles at home with his relationship and his girlfriends children. He talked about the stress of probation and back and forth transferring counties with his probation. He talked about the closing on his house [facility] and his plan to move up there come warmer weather. 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    Intervention: I talked with him about his struggles in his relationship and if he has talked with his girlfriend about his struggles with her holding on to her grown children to long and her need to get them in a group home. We talked about having patience and approaching this in a sensitive way because they are still her children. We also talked about his ongoing geographical moves requiring transferring in probation. I talked with him about his motivation to complete treatment as this is an area that he continues to talk about not feeling like treatment is helpful or beneficial. He states that this is a requirement for probation and if he moves before he completes here [place]       Assessment: Client seems to be externally motivated and focused solely on his own needs with little regard for others.       Plan: Client and I will continue to meet individually. He will continue to make arrangements for his geographical move once his closing is finalized. He will follow guidelines of probation. He will be sensitive when addressing the needs of his girlfriends children with her.       Fang Mcdowell MA, [facility], [facility]

## 2020-01-29 ENCOUNTER — HOSPITAL ENCOUNTER (OUTPATIENT)
Dept: BEHAVIORAL HEALTH | Facility: CLINIC | Age: 55
End: 2020-01-29
Attending: SOCIAL WORKER
Payer: COMMERCIAL

## 2020-01-29 PROCEDURE — H2035 A/D TX PROGRAM, PER HOUR: HCPCS | Mod: HQ

## 2020-01-29 NOTE — PROGRESS NOTES
Prieto Guzmán  [id number]                                             Adult CD Progress Note      Attendance     Wednesday   Group Date: 1/29/2020   Group Attendance Attended group session   Group Therapy Type Life Skills   Group Topic Covered Emotions/Expression/Feelings   Client's Response To Group Topic Cooperative with task Listened actively   Client Group Participation Detail Highly involved   Group Attendance (Time) 3.0 Hours   Individual Attendance None   Family Attendance None   Other Comments/Information None       Data:   Client was engaged and participated in group discussion and activity related to the ELIDA WINDOW -  the psychological tool created by Adrian Holley and Tom Thorpe in 1955. It s a simple and useful tool for understanding   self-awareness   personal development   improving communications   interpersonal relationship  inter group relationships  It is one of the few tools out there that has an emphasis on  soft skills  such as behavior, empathy, cooperation, inter group development and interpersonal development. It s a great model to use because of its simplicity and also because it can be applied in a variety of situations   Clients were able to complete the prewritten questionnaire and then chart their personal windows from their results. Clients shared feedback with each other and were offered suggestions related to areas were there was room for potential growth.  Client discovered trust and healthy boundaries were potential growth areas          PRAVIN Guerrier

## 2020-01-29 NOTE — PROGRESS NOTES
Prieto Guzmán  [id number]           Treatment Plan Review     Total # of Phase 1 Group Sessions: 15                                Total # of Phase 2 Group Sessions: NA  Total # of Phase 3 Group Sessions: NA  Total # of 1:1 Sessions: 2     Support group attended this week: yes     Reporting sobriety: Yes    Treatment Plan Review completed on:  1/29/2020     Projected discharge date: 4/11/20     Client preferred learning style: Hands on     Staff member(s) contributing: Mallory GAGE, [facility]      Received supervision: Client's case was reviewed this week with treatment team which included; DIANA Rogers, BRITTANY, [facility],Brandi Plummer [facility], and MERARI Quinones, [facility]. Client's treatment goals and progress towards these goals were reviewed and it was determined no changes were needed this week.     Client involvement with treatment planning: contributed to goals and plan.     Client received copy of plan/revised plan: Yes     Client agrees with plan/revised plan: Yes     Changes to Treatment Plan: No     New Goals added since last review: None.     Goal(s) worked on since last review:   Prieto will be able to verbalize the need for support from this outpatient program  Prieto will build a sober support network, celebrate accomplishments, and develop/maintain a safe living environment  Prieto will remain compliant with conditions of probation  Prieto will develop effective strategies to maintain abstinence and manage symptoms of PAWS      Strategies effective: Yes     Treatment Coordination Activities: None.     Medical, Mental Health and other appointments the client attended: None  Medication issues: None.     Date and outcome of Last Urinalysis - Client submitted UA on 1/27/2020 which was negative for all illicit substances    Physical and mental health problems: None.     Review and evaluation of the individual abuse prevention plan: The program's abuse prevention plan (ART) is sufficient  for this client.      Substance Use Disorders:    303.90 (F10.20) Alcohol Use Disorder Severe     ASAM Risk Rating:     Dimension 1 0 No change    Dimension 2 0 No Change    Dimension 3 2 No Change    Dimension 4 2 No Change    Dimension 5 3 No Change      Dimension 6 2 No Change     Mallory Velez BS, [facility]

## 2020-01-29 NOTE — ADDENDUM NOTE
Encounter addended by: Mallory Velez [facility] on: 1/29/2020 1:08 PM   Actions taken: Clinical Note Signed

## 2020-01-31 NOTE — ADDENDUM NOTE
Encounter addended by: Alina Middleton LADC on: 1/30/2020 8:33 PM   Actions taken: Clinical Note Signed

## 2020-01-31 NOTE — PROGRESS NOTES
Case consultation:  D)  Counselor reviewed ct's case.  DOC alcohol.  SADIQ.  Ct is on probation for multiple legal issues.  Precontemplation.  P)  Work with ct to recognize defense mechanisms he uses, such as blame, and explore times alcohol has been problematic for him.    Alina Middleton, DIANA, LICSW, [facility]  Clinical

## 2020-02-04 ENCOUNTER — HOSPITAL ENCOUNTER (OUTPATIENT)
Dept: BEHAVIORAL HEALTH | Facility: CLINIC | Age: 55
End: 2020-02-04
Attending: SOCIAL WORKER
Payer: COMMERCIAL

## 2020-02-04 PROCEDURE — H2035 A/D TX PROGRAM, PER HOUR: HCPCS

## 2020-02-04 PROCEDURE — H2035 A/D TX PROGRAM, PER HOUR: HCPCS | Mod: HQ

## 2020-02-04 NOTE — ADDENDUM NOTE
Encounter addended by: Fang Mcdowell [facility] on: 2/4/2020 12:15 PM   Actions taken: Visit Navigator Flowsheet section accepted

## 2020-02-04 NOTE — GROUP NOTE
Psychoeducation Group Documentation    PATIENT'S NAME: Prieto Guzmán  MRN:   [id number]  :   1965  ACCT. NUMBER: [id number]  DATE OF SERVICE: 20  START TIME:  9:00 AM  END TIME: 12:00 PM  FACILITATOR(S): Fang Mcdowell LPCC  TOPIC: BEH Pyschoeducation  Number of patients attending the group:  4  Group Length:  3 Hours    Skills Group Therapy Type: Communication skills    Summary of Group / Topics Discussed:  Client participated in group discussion talking about what characteristics are included in healthy and unhealthy relationships. Client was asked to take a deeper look at the 5 closest people to them and understand more about if these relationships are healthy or unhealthy and address ways to make each relationship more healthy. Psychoeducation was also provided on the four styles of communication. Client taught back specific examples of each style of communication to ensure understanding of material. Client participated in role plays to practice assertiveness skills through the use of the assertiveness formula.       Group Attendance:  Attended group session    Patient's response to the group topic/interactions:  cooperative with task, discussed personal experience with topic and listened actively    Patient appeared to be Actively participating and Engaged.         Client specific details:  Client shared how some of the relationships in his life are not utilizing assertive communication. He talked about how this tool may be helpful in increasing understanding, patience and quality of the relationship.

## 2020-02-04 NOTE — PROGRESS NOTES
INDIVIDUAL THERAPY NOTE  TIME: 8:00am  Duration: 45 Minutes    Data: Client talked about his divorce court appearance yesterday and how he and his wife have agreed to go through mediation. He states the first mediation is scheduled for April 20 and he is optimistic they will be able to resolve all of this through mediation. He talked about how they spoke together yesterday for a about 30 minute and it went well. He also talked about his desire to get his drivers license back. He confirmed he has paid all of his fines and has started researching where to get interlock installed. He talked about one barrier being that [place] and he will need to find someone to bring it back for him.       Intervention: I talked with him about how mediation is a nice option for those that can make it work and the cost savings that typically goes along with this. We talked about their relationship, his relationship with his kids and the relationship between his wife and his kids a bit. We talked about the plan to get his drivers license back and how much freedom and responsibility comes with this. He is looking forward to the freedom.      Assessment: Client seems willing to work towards his drivers license and resolving the divorce.       Plan: Client and I will continue with individual therapy. Client will work out a plan to take his test with the DMV and work towards getting his truck down here to have interlock installed.       Fang Mcdowell MA, LADC, [facility]

## 2020-02-05 ENCOUNTER — HOSPITAL ENCOUNTER (OUTPATIENT)
Dept: BEHAVIORAL HEALTH | Facility: CLINIC | Age: 55
End: 2020-02-05
Attending: SOCIAL WORKER
Payer: COMMERCIAL

## 2020-02-05 PROCEDURE — H2035 A/D TX PROGRAM, PER HOUR: HCPCS | Mod: HQ

## 2020-02-05 ASSESSMENT — ANXIETY QUESTIONNAIRES
1. FEELING NERVOUS, ANXIOUS, OR ON EDGE: MORE THAN HALF THE DAYS
5. BEING SO RESTLESS THAT IT IS HARD TO SIT STILL: MORE THAN HALF THE DAYS
6. BECOMING EASILY ANNOYED OR IRRITABLE: MORE THAN HALF THE DAYS
GAD7 TOTAL SCORE: 12
3. WORRYING TOO MUCH ABOUT DIFFERENT THINGS: SEVERAL DAYS
IF YOU CHECKED OFF ANY PROBLEMS ON THIS QUESTIONNAIRE, HOW DIFFICULT HAVE THESE PROBLEMS MADE IT FOR YOU TO DO YOUR WORK, TAKE CARE OF THINGS AT HOME, OR GET ALONG WITH OTHER PEOPLE: SOMEWHAT DIFFICULT
2. NOT BEING ABLE TO STOP OR CONTROL WORRYING: SEVERAL DAYS
7. FEELING AFRAID AS IF SOMETHING AWFUL MIGHT HAPPEN: MORE THAN HALF THE DAYS

## 2020-02-05 ASSESSMENT — PATIENT HEALTH QUESTIONNAIRE - PHQ9
5. POOR APPETITE OR OVEREATING: MORE THAN HALF THE DAYS
SUM OF ALL RESPONSES TO PHQ QUESTIONS 1-9: 9

## 2020-02-05 NOTE — ADDENDUM NOTE
Encounter addended by: Fang Mcdowell LPCC on: 2/5/2020 11:46 AM   Actions taken: Visit Navigator Flowsheet section accepted

## 2020-02-05 NOTE — GROUP NOTE
"Group Therapy Documentation    PATIENT'S NAME: Prieto Guzmán  MRN:   [id number]  :   1965  ACCT. NUMBER: [id number]  DATE OF SERVICE: 20  START TIME:  9:00 AM  END TIME: 12:00 PM  FACILITATOR(S): Mallory Velez LADC  TOPIC: BEH Group Therapy  Number of patients attending the group:  3  Group Length:  3 Hours    Group Therapy Type: Life skill(s)    Summary of Group / Topics Discussed:    Communication - Active Listening  Active listening is one of the most difficult aspects of effective communication. The objective of this group is for clients to be able to describe personal strengths and weaknesses associated with their listening skills and one way they can work to improve areas in need of growth.  With the use of a hand out \"Active Listening\"  and role playing, the group learned how to identify potential blocks to listening effectiveness and skills to improve this effectiveness including paraphrasing, clarifying, and reflecting.  Clients role played examples of active listening as well as identified others active listening skills      Group Attendance:  Attended group session    Patient's response to the group topic/interactions:  cooperative with task    Patient appeared to be Engaged.        Client specific details:  Client was able to identify how clarifying or paraphrasing can help avoid misunderstandings. Client agreed to practice these skills at home over the weekend.  "

## 2020-02-06 ASSESSMENT — ANXIETY QUESTIONNAIRES: GAD7 TOTAL SCORE: 12

## 2020-02-10 ENCOUNTER — HOSPITAL ENCOUNTER (OUTPATIENT)
Dept: BEHAVIORAL HEALTH | Facility: CLINIC | Age: 55
End: 2020-02-10
Attending: SOCIAL WORKER
Payer: COMMERCIAL

## 2020-02-10 PROCEDURE — 80307 DRUG TEST PRSMV CHEM ANLYZR: CPT

## 2020-02-10 PROCEDURE — 80320 DRUG SCREEN QUANTALCOHOLS: CPT

## 2020-02-10 PROCEDURE — H2035 A/D TX PROGRAM, PER HOUR: HCPCS | Mod: HQ

## 2020-02-10 NOTE — GROUP NOTE
"Psychoeducation Group Documentation    PATIENT'S NAME: Prieto Guzmán  MRN:   [id number]  :   1965  ACCT. NUMBER: [id number]  DATE OF SERVICE: 2/10/20  START TIME:  9:00 AM  END TIME: 12:00 PM  FACILITATOR(S): Fang Mcdowell LPCC  TOPIC: BEH Pyschoeducation  Number of patients attending the group:  6  Group Length:  3 Hours    Skills Group Therapy Type: Personal Responsibility, Communication    Summary of Group / Topics Discussed:    Today we talked about personal responsibility and the importance of this in early recovery. We talked about attendance, being on time, completed a \"being out of place\" activity and talked about how recovery requires new behaviors. We talked about the tendency to fall back to old behaviors and the risk this places our recovery in. We also followed up on the communication exercise from last week and talked about how those that did not complete the assignment are showing behaviors consistent with addiction and lack of willingness to engage in the program. We talked about the importance of follow through on assignments assigned.           Group Attendance:  Attended group session    Patient's response to the group topic/interactions:  cooperative with task    Patient appeared to be Passively engaged.         Client specific details:  Client did not have his communication activity completed and was asked to turn this in tomorrow. Client seemed dismissive around the idea of personal responsibility. He can intellectually talk about the idea of being out of place generally but has a difficult time personalizing the information.    "

## 2020-02-11 ENCOUNTER — HOSPITAL ENCOUNTER (OUTPATIENT)
Dept: BEHAVIORAL HEALTH | Facility: CLINIC | Age: 55
End: 2020-02-11
Attending: SOCIAL WORKER
Payer: COMMERCIAL

## 2020-02-11 PROCEDURE — H2035 A/D TX PROGRAM, PER HOUR: HCPCS | Mod: HQ

## 2020-02-11 PROCEDURE — H2035 A/D TX PROGRAM, PER HOUR: HCPCS

## 2020-02-11 NOTE — ADDENDUM NOTE
Encounter addended by: Mallory Velez [facility] on: 2/11/2020 2:48 PM   Actions taken: Flowsheet data copied forward, Flowsheet accepted

## 2020-02-11 NOTE — PROGRESS NOTES
"INDIVIDUAL THERAPY NOTE  TIME: 08:00am  Duration: 45 Minutes    Data: Client talked about how he made changes to his upcoming probation meeting and interlock installation appointment after our discussion in group yesterday as the interlock appointment would not excuse him from group. He states that it was going to be real cold this weekend and that prompted him to make the changes. He also talked about how he is working on relationships right now; specifically with his kids. He talked about how his daughter calls him \"toxic\" in his life and this is hurtful and confusing to him.       Intervention: We talked about his changing the appointments and the importance of him prioritizing treatment. We talked about his relationships with his kids and how he is trying very hard. We talked about how relationships are complicated and discussed what he has control over and what he doesn't have control over. We talked about how perception can be different and it sounds like that is what he is struggling with in his relationship with his daughter. We talked about his fear that he needs to just give up. We talked about what ongoing effort can look like and sometimes he can continue to try to work on the relationship without being too pushy. We talked about how peoples experiences are different and even if he doesn't understand why she thinks and says what she does it's been her reality. We talked about how relationships are also tricky because often times one person will try or try harder and then when the other person is willing to try timing is off.     I reviewed client's treatment plan with him and we outlined some assignments he is behind on. He states he will work on these over the next weeks.     I also talked with him about [place]. He confirmed he has closed on the house and is waiting for the snow to melt so he can get to his property and start moving up there. 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 He states he will be able to have the bait shop right on his property due to having more acreage. He talked about how his shop was a place to hang out for locals and he missed the comradery.       Assessment: Client seems to care about mending the relationships with his kids and may be dismissing their reality of when he was drinking and using. He seems to want to move forward at the expense of ignoring his past.       Plan: Client and I will continue to meet individually weekly. We will continue to work on gaining insight in to the program and finding appropriate solutions.      Fang Mcdowell MA, LADC, LPCC

## 2020-02-11 NOTE — ADDENDUM NOTE
Encounter addended by: Fang Mcdowell LPCC on: 2/11/2020 7:29 AM   Actions taken: Visit Navigator Flowsheet section accepted

## 2020-02-11 NOTE — ADDENDUM NOTE
Encounter addended by: Fang Mcdowell LPCC on: 2/11/2020 11:56 AM   Actions taken: Charge Capture section accepted

## 2020-02-11 NOTE — ADDENDUM NOTE
Encounter addended by: Mallory Velez [facility] on: 2/11/2020 2:11 PM   Actions taken: Order list changed, Multistep and multistep collection tasks completed

## 2020-02-11 NOTE — GROUP NOTE
"Group Therapy Documentation    PATIENT'S NAME: Prieto Guzmán  MRN:   [id number]  :   1965  ACCT. NUMBER: [id number]  DATE OF SERVICE: 20  START TIME:  9:00 AM  END TIME: 12:00 PM  FACILITATOR(S): Mallory Velez LADC  TOPIC: BEH Group Therapy  Number of patients attending the group:  7  Group Length:  3 Hours    Group Therapy Type: Psychoeducation    Summary of Group / Topics Discussed:    Communication  Understanding the 5 Conflict management styles;   Compromising, Competing, Avoiding, Giving-in, and Collaborative  In order to be successful in life, you must be able to manage conflict. Clients completed the \"Conflict Management Style\" assessment to help them understand how they approach conflict in their personal and/or professional life. Clients then reviewed the 5 conflict management styles and were able to identify positive and negative qualities of each style, and situations in which the style would, or would not be effective.   Clients then broke up into groups of 2 and role played each style with the help of the facilitator      Group Attendance:  Attended group session    Patient's response to the group topic/interactions:  cooperative with task    Patient appeared to be Engaged.        Client specific details: Client identified the Avoiding and Giving-in style of conflict management as styles he tends to use in his personal relationships and was able to verbalize how these styles can lead to resentments and reports he wants to work more on assertive communication and developing a collaborative style with his partner .  "

## 2020-02-12 ENCOUNTER — HOSPITAL ENCOUNTER (OUTPATIENT)
Dept: BEHAVIORAL HEALTH | Facility: CLINIC | Age: 55
End: 2020-02-12
Attending: SOCIAL WORKER
Payer: COMMERCIAL

## 2020-02-12 LAB — ETHYL GLUCURONIDE UR QL: NEGATIVE

## 2020-02-12 PROCEDURE — H2035 A/D TX PROGRAM, PER HOUR: HCPCS | Mod: HQ

## 2020-02-12 NOTE — PROGRESS NOTES
Case consultation:  D)  Therapist reviewed ct's case.  DOC alcohol.  SADIQ.  Ct remains in precontemplation.  Listens and contributes in group but doesn't seem to personalize, internalize information.  P)  Continue to help ct look at his substance use and the consequences to facilitate a move to contemplation.    DIANA Rogers, LICSW, [facility]  Clinical

## 2020-02-12 NOTE — ADDENDUM NOTE
Encounter addended [facility]: [facility] on: 2/12/2020 3:39 PM   Actions taken: Clinical Note Signed

## 2020-02-12 NOTE — GROUP NOTE
Group Therapy Documentation    PATIENT'S NAME: Prieto Guzmán  MRN:   [id number]  :   1965  ACCT. NUMBER: [id number]  DATE OF SERVICE: 20  START TIME:  9:00 AM  END TIME: 12:00 PM  FACILITATOR(S): Mallory Velez LADC  TOPIC: BEH Group Therapy  Number of patients attending the group:  4  Group Length:  3 Hours    Group Therapy Type: Addiction    Summary of Group / Topics Discussed:    Clients were introduced to the Ben Contreras story which discussed, in depth, the addiction this famous  fought from his teenage years into adulthood.   The objective was to help clients in early recovery learn more about their own addiction, recognize roadblocks to recovery, and deepen the understanding of the path they will follow toward recovery  Clients were able to identify Gildas cross addictive behaviors, including moving from one drug to another to another. Client were able to identify warning signs for addiction early in Ben's life and people in Ben's life who enabled him to continue to use. Client's were then able to identify the benefits of a recovery support network and the importance of talking about addiction and the struggles it creates in our lives      Group Attendance:  Attended group session    Patient's response to the group topic/interactions:  cooperative with task    Patient appeared to be Engaged.        Client specific details:  Client was engaged in discussion and was able to give personal examples of enabling friends/family and how these individuals helped fuel his addiction.

## 2020-02-12 NOTE — PROGRESS NOTES
Prieto Guzmán  [id number]           Treatment Plan Review     Total # of Phase 1 Group Sessions: 18                                Total # of Phase 2 Group Sessions: NA  Total # of Phase 3 Group Sessions: NA  Total # of 1:1 Sessions: 2     Support group attended this week: yes     Reporting sobriety: Yes    Treatment Plan Review completed on:  2/12/2020     Projected discharge date: 4/11/20     Client preferred learning style: Hands on     Staff member(s) contributing: Mallory GAGE, [facility]      Received supervision: Client's case was reviewed this week with treatment team which included; DIANA Rogers, [facility], [facility], Fang Mcdowell MA, [facility], [facility] Brandi Plummer [facility], and MERARI Quinones, [facility]. Client's treatment goals and progress towards these goals were reviewed and it was determined no changes were needed this week.     Client involvement with treatment planning: contributed to goals and plan.     Client received copy of plan/revised plan: Yes     Client agrees with plan/revised plan: Yes     Changes to Treatment Plan: No     New Goals added since last review: None.     Goal(s) worked on since last review:   Prieto will be able to verbalize the need for support from this outpatient program  Prieto will build a sober support network, celebrate accomplishments, and develop/maintain a safe living environment  Prieto will remain compliant with conditions of probation  Prieto will develop effective strategies to maintain abstinence and manage symptoms of PAWS      Strategies effective: Yes     Treatment Coordination Activities: None.     Medical, Mental Health and other appointments the client attended: Individual with Fang Mcdowell MA, [facility], [facility]  Medication issues: None.     Date and outcome of Last Urinalysis - Client submitted UA on 2/11/2020 which was negative for all illicit substances    Physical and mental health problems: None.     Review and evaluation of the individual abuse  prevention plan: The program's abuse prevention plan (ART) is sufficient for this client.      Substance Use Disorders:    303.90 (F10.20) Alcohol Use Disorder Severe     ASAM Risk Rating:     Dimension 1 0 No change    Dimension 2 0 No Change    Dimension 3 2 No Change    Dimension 4 2 No Change    Dimension 5 3 No Change      Dimension 6 2 No Change     Mallory GAGE, [facility]

## 2020-02-17 ENCOUNTER — HOSPITAL ENCOUNTER (OUTPATIENT)
Dept: BEHAVIORAL HEALTH | Facility: CLINIC | Age: 55
End: 2020-02-17
Attending: SOCIAL WORKER
Payer: COMMERCIAL

## 2020-02-17 PROCEDURE — H2035 A/D TX PROGRAM, PER HOUR: HCPCS | Mod: HQ

## 2020-02-17 NOTE — GROUP NOTE
Psychoeducation Group Documentation    PATIENT'S NAME: Prieto Guzmán  MRN:   [id number]  :   1965  ACCT. NUMBER: [id number]  DATE OF SERVICE: 20  START TIME:  9:00 AM  END TIME: 12:00 PM  FACILITATOR(S): Fang Mcdowell LPCC  TOPIC: BEH Pyschoeducation  Number of patients attending the group:  6  Group Length:  3 Hours    Skills Group Therapy Type: Family    Summary of Group / Topics Discussed:  We talked about the family disease of addiction. We talked about how addiction does not discriminate, addiction is a disease, addiction is a family disease, the importance of fellowship and the universality of the effects of addiction. Client was asked to identify what they learned and how they could relate to each of these topics. We also discussed the importance of focusing on similarities versus the differences in individuals and families. Client's were also asked to identify the differences between sobriety and recovery and share in group tomorrow.             Group Attendance:  Attended group session    Patient's response to the group topic/interactions:  cooperative with task    Patient appeared to be Engaged.         Client specific details:  Client talked about how he could relate to the information being presented. When I talked with him about specifically how understanding how his family had been impacted by his use and if that helped him understand where his kids may have a difficult time forgiving and moving forward he denied agreeing with that. He seems to think the world goes on his time table and if he is ready to move forward he struggles to see how others wouldn't be ready too.

## 2020-02-18 ENCOUNTER — HOSPITAL ENCOUNTER (OUTPATIENT)
Dept: BEHAVIORAL HEALTH | Facility: CLINIC | Age: 55
End: 2020-02-18
Attending: SOCIAL WORKER
Payer: COMMERCIAL

## 2020-02-18 PROCEDURE — H2035 A/D TX PROGRAM, PER HOUR: HCPCS | Mod: HQ

## 2020-02-18 PROCEDURE — H2035 A/D TX PROGRAM, PER HOUR: HCPCS

## 2020-02-18 NOTE — PROGRESS NOTES
INDIVIDUAL THERAPY NOTE  TIME: 8:00am  Duration: 45 Minutes    Data: Client talked about how he feels things are going well and he is going to schedule a meeting with the CD counselor to discuss transitioning to phase 2. He talked about the stress of working on getting his drivers license back and how much more freedom he will have after he gets this back. He talked about the [place] and [place] but not having the ability to drive himself. He also talked about his experiencing and belief in  culture despite not being .       Intervention: We talked about where his motivation is at regarding treatment versus more life skills. 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 He talked about how frustrating some of these tasks are and are taking to long for him. I attempted to help him see the natural consequences of ones addiction. He seems to put the blame on others instead of taking personal responsibility. Client and I talked about how going to the [employer] may be more advantageous as then he is face to face and can make more progress. He agreed and stated that is why he is going there this afternoon.     Assessment: Client seems to minimize the impact his addiction has had on his life and focuses on how others need to just move on. He seems to think that sometimes people and organizations are out to get him and make his life harder instead of taking personal responsibility.       Plan: Client will schedule an appointment with CD counselor to present his transition paper. We will continue to meet individually.    Fang Mcdowell MA, LADC, LPCC

## 2020-02-18 NOTE — GROUP NOTE
Group Therapy Documentation    PATIENT'S NAME: Prieto Guzmán  MRN:   [id number]  :   1965  ACCT. NUMBER: [id number]  DATE OF SERVICE: 20  START TIME:  9:00 AM  END TIME: 12:00 PM  FACILITATOR(S): Mallory Velez LADC  TOPIC: BEH Group Therapy  Number of patients attending the group:  4  Group Length:  3 Hours    Group Therapy Type: Addiction    Summary of Group / Topics Discussed:    Recovery vs. Sobriety   Today the group discussed how recovery is so much more than just putting the substances down. Anyone can put the drugs and drink away for a day and call themselves sober, but not everyone can say they are in recovery. Not everyone can say they have begun the journey to better themselves.   The three key points covered were:  Recovery is working through the issues that caused you to drink and use drugs. Sober is just putting them down.  Recovery is realizing that drugs and alcohol were the solution to the problem and not the problem themselves.  Recovery is about changing our behavior. Sobriety is just about changing our drinking and using habits.  Client were asked to discuss what recovery means to them and share with group      Group Attendance:  Attended group session    Patient's response to the group topic/interactions:  cooperative with task    Patient appeared to be Engaged.        Client specific details:  Client was able to verbalize that recovery isn t a one-time thing, it is a lifelong journey.

## 2020-02-19 NOTE — PROGRESS NOTES
Prieto Guzmán  [id number]           Treatment Plan Review     Total # of Phase 1 Group Sessions: 20                                Total # of Phase 2 Group Sessions: NA  Total # of Phase 3 Group Sessions: NA  Total # of 1:1 Sessions: 2     Support group attended this week: yes     Reporting sobriety: Yes    Treatment Plan Review completed on:  2/19/2020     Projected discharge date: 4/11/20     Client preferred learning style: Hands on     Staff member(s) contributing: Mallory GAGE, PRAVIN      Received supervision: No     Client involvement with treatment planning: contributed to goals and plan.     Client received copy of plan/revised plan: Yes     Client agrees with plan/revised plan: Yes     Changes to Treatment Plan: No     New Goals added since last review: None.     Goal(s) worked on since last review:   Prieto will be able to verbalize the need for support from this outpatient program  Prieto will remain compliant with conditions of probation        Strategies effective: Yes     Treatment Coordination Activities: None.     Medical, Mental Health and other appointments the client attended: Individual with Fang Mcdowell MA, PRAVIN, [facility]  Medication issues: None.     Date and outcome of Last Urinalysis - Client submitted UA on 2/11/2020 which was negative for all illicit substances    Physical and mental health problems: None.     Review and evaluation of the individual abuse prevention plan: The program's abuse prevention plan (ART) is sufficient for this client.      Substance Use Disorders:    303.90 (F10.20) Alcohol Use Disorder Severe     ASAM Risk Rating:     Dimension 1 0 No change    Dimension 2 0 No Change    Dimension 3 2 No Change    Dimension 4 2 No Change    Dimension 5 3 No Change      Dimension 6 2 No Change     Mallory GAGE, PRAVIN

## 2020-02-24 ENCOUNTER — HOSPITAL ENCOUNTER (OUTPATIENT)
Dept: BEHAVIORAL HEALTH | Facility: CLINIC | Age: 55
End: 2020-02-24
Attending: SOCIAL WORKER
Payer: COMMERCIAL

## 2020-02-24 ENCOUNTER — TELEPHONE (OUTPATIENT)
Dept: BEHAVIORAL HEALTH | Facility: CLINIC | Age: 55
End: 2020-02-24

## 2020-02-24 PROCEDURE — H2035 A/D TX PROGRAM, PER HOUR: HCPCS

## 2020-02-24 PROCEDURE — H2035 A/D TX PROGRAM, PER HOUR: HCPCS | Mod: HQ

## 2020-02-24 NOTE — TELEPHONE ENCOUNTER
----- [facility] at 2/24/2020  9:32 AM CST -----  Please schedule 12 sessions of [id number] starting 3/2/2020  Thanks!

## 2020-02-24 NOTE — TELEPHONE ENCOUNTER
----- Message from Mallory Velez [facility] sent at 2/24/2020  8:02 AM CST -----  Regarding: No TONY assigned.  I cannot check this client in because it says there is no hospital account. HELP!  Thanks

## 2020-02-24 NOTE — PROGRESS NOTES
INDIVIDUAL SESSION NOTE 2/24/2020    Data:   Client and counselor met to review client's transition paper. Client reports feeling ready to transition to phase 2. He writes that he has made new sober friends, is engaging in sober activities, and is feeling really healthy. Client's UA results have been negative since admission and he is compliant with probation. Client is preparing to move up north May 1st and reports he is looking forward to fishing, skiing, and starting a new business. Client reports playing guitar, walking, and working in the [employer] as ways he mary ellen with stress and has been sober since 6/17/2019. Client does admit that he misses drinking sometimes but realizes he would end up right back where he started if he drank.    Intervention:   Counselor acknowledged his progress and asked client what his goal for Phase 2 will be. Client has a couple of ideas, but will present his new goal tomorrow, 2/25/2020    Assessment:  Client appears engaged and honest. Client is between Preparation/Action Stage    Plan:   Client will present goal for phase 2 tomorrow, 2/25/2020 and will transition to phase 2 on 3/2/2020    Mallory GAGE, [facility]

## 2020-02-24 NOTE — TELEPHONE ENCOUNTER
----- Message from PRAVIN Kaiser sent at 2/24/2020  7:34 AM CST -----  Please schedule 3 sessions for this client starting today 2/24/2020 [id number]

## 2020-02-24 NOTE — TELEPHONE ENCOUNTER
----- [facility] at 2/24/2020 10:29 AM CST -----  Please schedule an individual appointment for this client on 2/25 at 8am. Thank you.

## 2020-02-24 NOTE — GROUP NOTE
Group Therapy Documentation    PATIENT'S NAME: Prieto Guzmán  MRN:   [id number]  :   1965  [facility] [id number]  DATE OF SERVICE: 20  START TIME:  9:00 AM  END TIME: 12:00 PM  FACILITATOR(S): Fang Mcdowell LPCC  TOPIC: BEH Group Therapy  Number of patients attending the group:  6  Group Length:  3 Hours    Group Therapy Type: Psychoeducation    Summary of Group / Topics Discussed:    Psychoeducation/Skills Family Dynamics (MH)  This topic will include discussing family roles, communication within the family system, and understanding the impact of mental health and substance abuse issues on the family.     Objective(s):    Identify healthy versus unhealthy roles that may appear in families    Name two affects addiction has on the brain    Identify three tools to use for self-care    Identify two communication skills    Define the difference between healthy and unhealthy boundaries.    Structure (modalities, homework, worksheets, etc)     Discuss communication strengths and weaknesses with in the family system.    Patient and families will role play assertive communication skills based on provided case scenarios.     Therapist will teach about the family disease of mental health and substance abuse.    Therapist will facilitate group discussion about the impact patients diagnosis(s) have had on the family system.     Use teach back techniques to ensure patients understanding.    Expected therapeutic outcome(s):  Patient will:    Reduced conflict and increase cohesion in the family system.    Therapeutic outcome(s) measured by:     Patient and families will complete a pre-test and post-test.       Group Attendance:  Attended group session    Patient's response to the group topic/interactions:  cooperative with task    Patient appeared to be Engaged.        Client specific details:  Today we talked about the family disease of addiction, criteria for substance abuse issues and the progressive  nature of addiction. We talked about the importance of addressing mental health and substance abuse issues simultaneously. Client shared how they could relate to the family disease of addiction and verbalized understanding of some enabling behaviors that have taken place in their family. We also talked about communication styles and the pros and cons of the four main styles of communication. Client was able to verbalize insight and practice each of the four styles of communication..

## 2020-02-24 NOTE — ADDENDUM NOTE
Encounter addended by: [facility] on: 2/24/2020 1:55 PM   Actions taken: Visit Navigator Flowsheet section accepted

## 2020-02-25 ENCOUNTER — HOSPITAL ENCOUNTER (OUTPATIENT)
Dept: BEHAVIORAL HEALTH | Facility: CLINIC | Age: 55
End: 2020-02-25
Attending: SOCIAL WORKER
Payer: COMMERCIAL

## 2020-02-25 ENCOUNTER — HOSPITAL ENCOUNTER (OUTPATIENT)
Dept: BEHAVIORAL HEALTH | Facility: CLINIC | Age: 55
End: 2020-02-25
Attending: FAMILY MEDICINE
Payer: COMMERCIAL

## 2020-02-25 PROCEDURE — 80320 DRUG SCREEN QUANTALCOHOLS: CPT

## 2020-02-25 PROCEDURE — 80307 DRUG TEST PRSMV CHEM ANLYZR: CPT

## 2020-02-25 PROCEDURE — H2035 A/D TX PROGRAM, PER HOUR: HCPCS | Mod: HQ

## 2020-02-25 PROCEDURE — H2035 A/D TX PROGRAM, PER HOUR: HCPCS

## 2020-02-25 ASSESSMENT — ANXIETY QUESTIONNAIRES
2. NOT BEING ABLE TO STOP OR CONTROL WORRYING: SEVERAL DAYS
3. WORRYING TOO MUCH ABOUT DIFFERENT THINGS: SEVERAL DAYS
7. FEELING AFRAID AS IF SOMETHING AWFUL MIGHT HAPPEN: MORE THAN HALF THE DAYS
6. BECOMING EASILY ANNOYED OR IRRITABLE: MORE THAN HALF THE DAYS
1. FEELING NERVOUS, ANXIOUS, OR ON EDGE: SEVERAL DAYS
GAD7 TOTAL SCORE: 9
5. BEING SO RESTLESS THAT IT IS HARD TO SIT STILL: SEVERAL DAYS
IF YOU CHECKED OFF ANY PROBLEMS ON THIS QUESTIONNAIRE, HOW DIFFICULT HAVE THESE PROBLEMS MADE IT FOR YOU TO DO YOUR WORK, TAKE CARE OF THINGS AT HOME, OR GET ALONG WITH OTHER PEOPLE: SOMEWHAT DIFFICULT

## 2020-02-25 ASSESSMENT — PATIENT HEALTH QUESTIONNAIRE - PHQ9
SUM OF ALL RESPONSES TO PHQ QUESTIONS 1-9: 7
5. POOR APPETITE OR OVEREATING: SEVERAL DAYS

## 2020-02-25 NOTE — GROUP NOTE
"Group Therapy Documentation    PATIENT'S NAME: Prieto Guzmán  MRN:   [id number]  :   1965  ACCT. NUMBER: [id number]  DATE OF SERVICE: 20  START TIME:  9:00 AM  END TIME: 12:00 PM  FACILITATOR(S): Mallory Velez LADC  TOPIC: BEH Group Therapy  Number of patients attending the group:  6  Group Length:  3 Hours    Group Therapy Type: Addiction    Summary of Group / Topics Discussed:    Group completed daily check in's and then proceeded to topic. Group discussed the 4 lifestyle areas requiring stability to build a solid foundation for recovery. Clients were asked to list and share their personal strengths and weaknesses in each of the 4 areas; Living Environment, Recovery Support System, Employment or Daily Activities, and Relationships. Client then discussed why it is necessary to stabilize each of these areas to be successful in recovery. Clients were challenged to share one way, or one thing they can do this week that supports building a solid foundation.      Group Attendance:  Attended group session    Patient's response to the group topic/interactions:  cooperative with task and appeared to have euphoric recall of use    Patient appeared to be Engaged.        Client specific details:  Client reported things are going well during check-in and he has an upcoming court date on . Client appeared to struggle with identifying strengths in any of the 4 areas because he is not currently working, he is living with his girlfriend temporarily until he moves [place], he is not attending meetings on a regular basis, and is estranged from his children at this time. Client did share that he ran into his adult son at a fast food place and they had a brief conversation that went well. Client is hoping his son will accept an invitation to go fishing soon.   Client continues to talk about \"the good ol' days\" and is often challenged to look at the reality of those \"days\" and how the actually ended. " Client appears to struggle with this but continues to be open to feedback. Client will be calling his son to invite him fishing.

## 2020-02-25 NOTE — PROGRESS NOTES
INDIVIDUAL THERAPY NOTE  TIME: 8:00am  Duration: 45 Minutes    Data: Client talked about how he was approved yesterday for his transition to phase 2 and is looking forward to having one more day back in his week to address other things like; probation, moving and getting his drivers license back. He presented his assignment from group last week about the differences between sobriety and recovery. He talked about running in to his son whom he hasn't seen in months last weekend.        Intervention: I talked with him about the process of probation meetings, lack of freedom without his drivers license and his plans to move up north now that the weather is getting nicer and he can access his property. He talked about how the drivers license is still pending and we talked about the importance of keeping up on the DM[facility] to ensure things are processed in a timely manner. He talked about his probation meeting this Friday and denied anything major but that instead it was a regular check in. I talked with him about the interaction he had with his son over the weekend and how by chance their meeting was. We talked about how this could be a good sign of more interaction moving forward. I cautioned him around if his son declines his invite to go with him this coming weekend up [place] to not read to much in to it as it's last minute and may put to much pressure on him. We talked about alternative invites that may be shorter and less pressure such as meeting for coffee or dinner. Client verbalized understanding and is hopeful that this is the start of reconnecting and rebuilding their relationship.     Assessment: Client seems to struggle with understanding how his use impacted and can still impact his children despite him not using anymore. We talked about the healing process being different for everyone. Client presents excited to move back up [place] and start fresh again.     Plan: Client and I will decrease our individual meetings  to every other week. We will continue to explore relationships in his life.      Fang Mcdowell MA, LADC, LPCC

## 2020-02-26 LAB — ETHYL GLUCURONIDE UR QL: NEGATIVE

## 2020-02-26 ASSESSMENT — ANXIETY QUESTIONNAIRES: GAD7 TOTAL SCORE: 9

## 2020-02-26 NOTE — ADDENDUM NOTE
Encounter addended by: Mallory [facility]elez [facility] on: 2/26/2020 11:29 AM   Actions taken: Pend clinical note

## 2020-02-26 NOTE — PROGRESS NOTES
[facility] INTENSIVE OUTPATIENT PROGRAM  TRANSITION PROGRESS NOTE     Patient: Prieto Guzmán  MRN; [id number]   : 1965  Age: 54 year old Sex: male  IOP ADMISSION DATE: 2019  TRANSITION DATE: 3/2/2020  TRANSITIONING FROM: Phase 1 TO: Phase 2  SUBSTANCE USE DISORDERS:   303.90 (F10.20) Alcohol Use Disorder Severe    LAST USE DATE: 2019    Treatment Plan Review completed on:  2020       Total # of Phase 1 Group Sessions: 22     Total # of Phase 2 Group Sessions: N/A  Total # of Phase 3 Group Sessions: N/A  Total # of 1:1 Sessions: 3  Length of stay/projected discharge date: 2020    Support group attended this week: no    Reporting sobriety: Yes               Learning Style(s):    Hands on    Staff member contributing: PRAVIN Guerrier     Received supervision: Client's case was reviewed this week with treatment team which included; DIANA Rogers, [facility], [facility], Fang Mcdowell MA, LAD, [facility] OSVALDO Narayanan, and MERARI Quinones, [facility]. Client's treatment goals and progress towards these goals were reviewed and it was determined client is appropriate for the next phase of treatment    Client contributed to goals and plan: Yes    Client received a signed copy of treatment plan/revised plan: Yes    Changes to Treatment Plan: No.    Client agrees with plan/revised plan: Yes    Any changes in Vulnerable Adult Status: No    Treatment Coordination Activities: No.    Medical, Mental Health and other appointments the client attended: Individual session with Fang COSTELLO [facility]    Medication issues: No.    Physical and mental health problems: No.    Review and evaluation of the individual abuse prevention plan: IAPP reviewed. ART appears effective at this time    Dimension One: Acute Intoxication/Withdrawal Potential     Risk at admission to IOP: 0. Risk at transition to Phase 2: Risk Ratin.     Current ASAM criteria: no withdrawal risk.      Treatment Summary/Justification of Transition DIM 1:  Client does not endorse any withdrawal symptoms and shows no signs of intoxication. All UA's, including most current, collected on 2020 was negative for illicit substances. Client's lifestyle appears congruent with his desire to remain abstinent.    Dimension Two: Biomedical Conditions and Complaints     Risk at admission to IOP: 0. Risk at transition to Phase 2: Risk Ratin.     Current ASAM criteria: none.     Treatment Summary/Justification of Transition DIM 2: Client appears to be maintaining good physical health and engages in regular exercise including skiing and walking. Client seeks medical attention when needed and has health insurance  Dimension Three: Emotional/Behavioral/Cognitive Conditions and Complications     Risk at admission to IOP: 2. Risk at transition to Phase 2: Risk Ratin.     Current ASAM criteria: stable     Treatment Summary/Justification of Transition DIM 3: Client is generally functioning pretty well. Client reports having a positive relationship with his girlfriend but is still working to rebuild relationships with his adult children. Client is able to verbalize how his use has affected his mental health including feelings of shame/guilt. Client continues to work towards increasing self-esteem and confidence and verbalizes the desire to development meaningful relationships with others     Dimension Four: Readiness to Change     Risk at admission to IOP: 2. Risk at transition to Phase 2: Risk Ratin.     Current ASAM criteria: cooperative.     Treatment Summary/Justification of Transition DIM 4: Client appears to be between preparation and action stage of change. He verbalizes a desire to maintain abstinence and increase meaning in his life and his behaviors appear consistent with this     Dimension Five: Relapse/Continues Use/Continues Problem Potential     Risk at admission to IOP: 3. Risk at transition to Phase 2:  Risk Ratin.     Current ASAM criteria: recognizes relapse issues and prevention strategies but displays some vulnerability for further substance use problems     Treatment Summary/Justification of Transition DIM 5:  Client is able to verbalize external triggers and can identify and reports implementing coping strategies to manage external triggers. Client continues to work towards understanding internal/emotional triggers and developing a realistic and healthy plan to manage these triggers long term.     Dimension Six: Recovery Environment     Risk at admission to IOP: 2. Risk at transition to Phase 2: Risk Ratin.     Current ASAM criteria: can cope with structure and support     Treatment Summary/Justification of Transition DIM 6: Client is exploring support groups and continues working to build sober support network. Client has remained compliant with conditions of probation. Client has purchased a home and is planning a move at the beginning of May. Client appears to be  making an effort to participate in sober activities    PLAN: Transition client to Phase 2 effective 3/2/2020 .   Client currently meets the ASAM criteria for; Outpatient (ASAM level 1)  level of care.    PRAVIN Guerrier

## 2020-02-27 NOTE — TELEPHONE ENCOUNTER
----- Message from GIL Martinez sent at 2/26/2020  7:45 AM CST -----  Please put client in groups on only Monday and Tuesday as they are in phase 2 which is [id number].

## 2020-03-02 ENCOUNTER — HOSPITAL ENCOUNTER (OUTPATIENT)
Dept: BEHAVIORAL HEALTH | Facility: CLINIC | Age: 55
End: 2020-03-02
Attending: SOCIAL WORKER
Payer: COMMERCIAL

## 2020-03-02 PROCEDURE — H2035 A/D TX PROGRAM, PER HOUR: HCPCS | Mod: HQ

## 2020-03-03 ENCOUNTER — HOSPITAL ENCOUNTER (OUTPATIENT)
Dept: BEHAVIORAL HEALTH | Facility: CLINIC | Age: 55
End: 2020-03-03
Attending: SOCIAL WORKER
Payer: COMMERCIAL

## 2020-03-03 PROCEDURE — 80307 DRUG TEST PRSMV CHEM ANLYZR: CPT

## 2020-03-03 PROCEDURE — 80320 DRUG SCREEN QUANTALCOHOLS: CPT

## 2020-03-03 PROCEDURE — H2035 A/D TX PROGRAM, PER HOUR: HCPCS | Mod: HQ

## 2020-03-03 ASSESSMENT — ANXIETY QUESTIONNAIRES
IF YOU CHECKED OFF ANY PROBLEMS ON THIS QUESTIONNAIRE, HOW DIFFICULT HAVE THESE PROBLEMS MADE IT FOR YOU TO DO YOUR WORK, TAKE CARE OF THINGS AT HOME, OR GET ALONG WITH OTHER PEOPLE: SOMEWHAT DIFFICULT
GAD7 TOTAL SCORE: 9
6. BECOMING EASILY ANNOYED OR IRRITABLE: SEVERAL DAYS
7. FEELING AFRAID AS IF SOMETHING AWFUL MIGHT HAPPEN: SEVERAL DAYS
5. BEING SO RESTLESS THAT IT IS HARD TO SIT STILL: SEVERAL DAYS
3. WORRYING TOO MUCH ABOUT DIFFERENT THINGS: SEVERAL DAYS
1. FEELING NERVOUS, ANXIOUS, OR ON EDGE: MORE THAN HALF THE DAYS
2. NOT BEING ABLE TO STOP OR CONTROL WORRYING: SEVERAL DAYS

## 2020-03-03 ASSESSMENT — PATIENT HEALTH QUESTIONNAIRE - PHQ9
SUM OF ALL RESPONSES TO PHQ QUESTIONS 1-9: 10
5. POOR APPETITE OR OVEREATING: MORE THAN HALF THE DAYS

## 2020-03-03 NOTE — GROUP NOTE
Group Therapy Documentation    PATIENT'S NAME: Prieto Guzmán  MRN:   [id number]  :   1965  ACCT. NUMBER: [id number]  DATE OF SERVICE: 3/02/20  START TIME:  9:00 AM  END TIME: 12:00 PM  FACILITATOR(S): Fang Mcdowell LPCC  TOPIC: BEH Group Therapy  Number of patients attending the group:  6  Group Length:  3 Hours    Group Therapy Type: Family Therapy    Summary of Group / Topics Discussed:    Boundaries, Communication, and Relationships      Group Attendance:  {Group Attendance:705234}    Patient's response to the group topic/interactions:  {OPBEHCLIENTRESPONSE:227569}    Patient appeared to be {Engagement:[id number]}.        Client specific details:  ***.

## 2020-03-03 NOTE — PROGRESS NOTES
Prieto SHELDON A[place]maricelpeewee  [id number]               Adult CD Progress Note and Treatment Plan Review     Attendance     Tuesday     Group Date: 12/10/19   Group Attendance Attended group session   Group Therapy Type Psychoeducation and Psychotherapeutic   Group Topic Covered Thinking Errors   Client's Response To Group Topic Cooperative with task   Client Group Participation Detail Adequate participation   Group Attendance (Time) 3.0 Hours   Individual Attendance None   Family Attendance None   Other Comments/Information None      Wednesday    Group Date: 12/11/19   Group Attendance Attended group session   Group Therapy Type Psychoeducation   Group Topic Covered Stages of Change   Client's Response To Group Topic Cooperative with task   Client Group Participation Detail Adequate participation   Group Attendance (Time) 3.0 Hours   Individual Attendance None   Family Attendance None   Other Comments/Information None     Total # of Phase 1 Group Sessions: 5     Total # of Phase 2 Group Sessions: NA  Total # of Phase 3 Group Sessions: NA  Total # of 1:1 Sessions: 1    Support group attended this week: yes    Reporting sobriety: Yes    Treatment Plan Review     Treatment Plan Review completed on:  12/10/2019     Projected discharge date: 4/11/20    Client preferred learning style: Hands on    Staff member(s) contributing: Mallory GAGE, LADC and Fang Mcdowell MA, [facility], [facility]    Received supervision: No    Client involvement with treatment planning: contributed to goals and plan.    Client received copy of plan/revised plan: Yes    Client agrees with plan/revised plan: Yes    Changes to Treatment Plan: No    Client's Dimension 1 Goal(s) Prieto will develop effective strategies to maintain abstinence and manage symptoms of PAWS   See below.   Client's Dimension 2 Goal(s) Prieto will improve and maintain healthy self care habits Goal not addressed this week.   Client's Dimension 3 Goal(s) Pending Goal not addressed  this week.   Client's Dimension 4 Goal(s) Prieto will be able to verbalize the need for support from this outpatient program  Prieto will develop intrinsic motivation to maintain recovery efforts See below.   Client's Dimension 5 Goal(s) Identify personal patterns of relapse and self sabotage  Goal not addressed this week.   Client's Dimension 6 Goal(s) Build a sober support network, celebrate accomplishments, and develop/maintain a safe living environment  Remain compliant with conditions of probation See below.     New Goals added since last review: None.    Goal(s) worked on since last review:   Prieto will be able to verbalize the need for support from this outpatient program  Build a sober support network, celebrate accomplishments, and develop/maintain a safe living environment  Prieto will develop effective strategies to maintain abstinence and manage symptoms of PAWS    Strategies effective: Yes    Treatment Coordination Activities: None.    Medical, Mental Health and other appointments the client attended: Client had session with Fang Mcdowell MA, [facility], [facility] privately and was then joined by MERARI Quinones, [facility] to discuss need for attendance contract. Client and staff both expressed concerns and client agreed to sign contract.    Medication issues: None.    Physical and mental health problems: None.    Review and evaluation of the individual abuse prevention plan: The program's abuse prevention plan (ART) is sufficient for this client.     Substance Use Disorders:    303.90 (F10.20) Alcohol Use Disorder Severe    ASAM Risk Rating:     Dimension 1 0 No change    Dimension 2 0 No Change    Dimension 3 2 No Change    Dimension 4 2 No Change    Dimension 5 3 No Change      Dimension 6 2 No Change    Data:   Client submitted UA on 12/10/19 which was negative for all illicit substances. Client reports last day of use reported as 6/17/19. Client reports his overall health at an 8 out of 10 and reports  "shoveling as a form of physical activity he engaged in. Client reports playing guitar, shooting his bow as strategies for coping with triggers. Client continues to report frustration because he knows he [place]\", but due to legal involvement, he has to stay in this area for now. Client reports [place] at the end of [place] and \"really needs\" to get back to [place] to take care of \"things\". Client reports he has no trust in law enforcement and believes he was retaliated against and this is what led to his legal troubles. Client reports he continues to have no communication with his adult children and relationships are \"obviously\" strained. Client reports he attended 1 meeting, Client has agreed to and signed an attendance contract stating he will call prior to missing group and have appropriate documentation if requested when he returns to group.    Intervention:   Tuesday - Client and counselor discussed thinking errors and patterns of thinking (often automatic) that are twisted, distorted, or false. Four common thinking errors we can engage in include blaming, assuming, redefining, all bad thinking. We discussed how an individual can resort to thinking errors when wanting to avoid or escape painful feelings, or wanting to avoid responsibility and accountability for mistakes. We also discussed why it is important to recognize and eliminate thinking errors in order to have good relationships and personal functioning. Client will work to identify 2 thinking errors he engages in and share these errors in group next week    Wednesday - Today we reviewed the stages of the Trans-theoretical Model of Change. We then read through multiple scenarios and client determined which stage the character in the scenario was currently in. Client was able to identify all stages correctly and verbalize which stage best described where he was at.  Client and counselor talked " "about different reasons individuals can be resistant to change and how change can be uncomfortable or awkward in the beginning. To demonstrate this, I had the clients participate in change activities including crossing arms as they normally would and then crossing arms the other way. We discussed how strange/awkward it felt to change from one way to another and related this to lifestyle changes in recovery and how uncomfortable it can be initially       Assessment:    Stages of Change Model  Contemplation  Preparation/Determination   Client is cooperative, but appears to be ambivalent about need for lifestyle change. Client also appears to blame others for consequences  Client appears to struggle with admitting the severity of his addiction. Client also appears to have somewhat of a \"victim like\" mentality and struggles to see his part in the unpleasant situations he finds himself in.    Appears/Sounds:  Cooperative  Ambivalent    Plan:   Client will complete task assigned in group on Tuesday, December 10th and be ready to share in group on Monday, December 16th    PRAVIN Guerrier  " No

## 2020-03-03 NOTE — GROUP NOTE
Group Therapy Documentation    PATIENT'S NAME: Prieto Guzmán  MRN:   [id number]  :   1965  ACCT. NUMBER: [id number]  DATE OF SERVICE: 3/03/20  START TIME:  9:00 AM  END TIME: 12:00 PM  FACILITATOR(S): Mallory Velez LADC  TOPIC: BEH Group Therapy  Number of patients attending the group:  4  Group Length:  3 Hours    Group Therapy Type: Addiction    Summary of Group / Topics Discussed:    Boundaries  Client reviewed topic from previous day and then discussed the definition of boundaries (what they are and what they aren't), why they are important, and important tips when setting healthy boundaries. Each client role played a real life situation in which they had to set a boundary. Clients were asked to practice the formula and write down at least once when they used the assertive formula with the boundary qualifier. Client was engaged and asked several questions. Client was able to use the formula correctly in the role playing exercise      Group Attendance:  Attended group session    Patient's response to the group topic/interactions:  cooperative with task    Patient appeared to be Actively participating.        Client specific details:  Client struggled to identify a personal boundary he needed to set, but when given a real life scenario, he was able to use the formula correctly.

## 2020-03-03 NOTE — ADDENDUM NOTE
Encounter addended by: Mallory Velez [facility] on: 3/3/2020 12:54 PM   Actions taken: Flowsheet data copied forward, Flowsheet accepted

## 2020-03-03 NOTE — ADDENDUM NOTE
Encounter addended by: Fang Mcdowell [facility] on: 3/3/2020 9:50 AM   Actions taken: Visit Navigator Flowsheet section accepted

## 2020-03-03 NOTE — ADDENDUM NOTE
Encounter addended by: Mallory Velez [facility] on: 3/3/2020 12:38 PM   Actions taken: Order list changed, Multistep and multistep collection tasks completed

## 2020-03-03 NOTE — GROUP NOTE
Group Therapy Documentation    PATIENT'S NAME: Prieto Guzmán  MRN:   [id number]  :   1965  ACCT. NUMBER: [id number]  DATE OF SERVICE: 3/02/20  START TIME:  9:00 AM  END TIME: 12:00 PM  FACILITATOR(S): Fang Mcdowell LPCC  TOPIC: BEH Group Therapy  Number of patients attending the group:  6  Group Length:  3 Hours    Group Therapy Type: Family Therapy    Summary of Group / Topics Discussed:    Boundaries, Communication, and Relationships      Group Attendance:  Attended group session    Patient's response to the group topic/interactions:  cooperative with task    Patient appeared to be Engaged.        Client specific details:   Client was encouraged to make a list of 3 boundaries they want in their life and use the assertiveness formula to write out how they will communicate these boundaries to those around them. They will then share these three boundaries with the people closest to them. Client talked about having to go to senior care this Thursday for 24 days. He is unsure if he will be allowed out for treatment. He will work on getting these details so we can plan accordingly.

## 2020-03-03 NOTE — ADDENDUM NOTE
Encounter addended by: Fang Mcdowell [facility] on: 3/3/2020 9:59 AM   Actions taken: Visit Navigator Flowsheet section accepted

## 2020-03-04 ASSESSMENT — ANXIETY QUESTIONNAIRES: GAD7 TOTAL SCORE: 9

## 2020-03-04 NOTE — ADDENDUM NOTE
Encounter addended by: Mallory Velez [facility] on: 3/4/2020 9:10 AM   Actions taken: Clinical Note Signed

## 2020-03-05 LAB — ETHYL GLUCURONIDE UR QL: NEGATIVE

## 2020-03-09 NOTE — TELEPHONE ENCOUNTER
----- [facility] sent at 3/9/2020  2:00 PM CDT -----  Please schedule an individual appointment for this client on 3/10 at 8am. Thank you.

## 2020-03-24 NOTE — PROGRESS NOTES
Case consultation:  Therapist reviewed ct's case.  Ct is on an MITCHELL due to being jailed.  He is due to return by the end of March.  P)  If ct does not return as scheduled, ct will be discharged.    DIANA Rogers, LICSW, [facility]

## 2020-03-24 NOTE — ADDENDUM NOTE
Encounter addended by: Alina Middleton, BRITTANY, [facility] on: 3/24/2020 1:23 PM   Actions taken: Clinical Note Signed

## 2020-03-30 ENCOUNTER — HOSPITAL ENCOUNTER (OUTPATIENT)
Dept: BEHAVIORAL HEALTH | Facility: CLINIC | Age: 55
End: 2020-03-30
Attending: FAMILY MEDICINE
Payer: COMMERCIAL

## 2020-03-30 PROCEDURE — H2035 A/D TX PROGRAM, PER HOUR: HCPCS | Mod: TEL

## 2020-03-30 NOTE — DISCHARGE SUMMARY
CHEMICAL DEPENDENCY DISCHARGE SUMMARY   NAME: Prieto Guzmán     MRN: [id number]     TREATMENT COUNSELOR:  MERARI Quinones, [facility]     REFERRAL SOURCE: Self / Legal     PROGRAM:  [facility], Adult Intensive OP CD Program     ADMISSION DATE:  11/19/2019     DISCHARGE DATE:  03/30/2020    ADMISSION DIAGNOSIS:    F10.20 Alcohol Use Disorder, Severe     DISCHARGE DIAGNOSIS:   F10.21 Alcohol Use Disorder, Severe - in early remission    DISCHARGE STATUS:   With Staff Approval     LAST USE DATE:    06/17/2019     HOURS OF TREATMENT COMPLETED:  96 hours     PRESENTING INFORMATION:  Client presented to treatment as a result of pending legal involvement for a DUI/ Refusal to Test     SERVICES PROVIDED:  Services included treatment and discharge planning, psycho education, recovery skills building, and individual and group therapy.  ISSUES ADDRESSED IN TREATMENT:     DIMENSION 1/ACUTE WITHDRAWAL ISSUES/DETOX:    Admit RR:  0   DC RR:  0    Client reported Alcohol as drug of choice with last date of use reported as 6/17/2019. Client did not endorse any symptoms of withdrawal at time of admission but as he progressed through the program he was able to identify anxiety, restlessness, irritability, and insomnia as possible protracted withdrawal symptoms. Client was drug tested on a weekly to bi-weekly basis and results were always negative for illicit substances. His last UA was obtained on 3/3/2020 and was also negative. Client is not at risk for withdrawal at time of discharge and has been abstinent for 9 months. Client completed goal in this dimension        DIMENSION 2/BIOMEDICAL:  Admit RR: 0   DC RR: 0   At time of admission client did not endorse any immediate medical concerns.Client was committed to improving and maintaining physical health throughout treatment and did so through increased physical activity and improved diet. Client improved self care habits throughout treatment as indicated by  his attention to his health. He would engage in mindful relaxation techniques as needed. Client completed goal in this dimension     DIMENSION 3/EMOTIONAL/BEHAVIORAL:   Admit RR: 2   DC RR: 1  Client presented to treatment with reports of anxiety and met criteria for substance induced anxiety.  Client worked on; gaining understanding of coping skills for emotions including walking and working on projects. Client reports at discharge that symptoms of depression and anxiety have decreased, which is consistent with his final PHQ-9 and SADIQ-7 scores. Client scored 7 on his latest PHQ-9 and indicated these symptoms make completing daily tasks somewhat difficult. Client scored 9 on his latest SADIQ-7 indicating a decrease in symptoms. Client's most recent CGI score compared to the patient's condition at the START of treatment, this patient's condition is 3- Minimally improved and . . Client's protective factors include; outside support system, newly learned coping strategies, and his significant other. Client's risk factors include; cravings, triggers, images of people using, and glorifying past use. Client denies any suicidal thought, plan or intent.  Client was encouraged to establish Individual Therapy for ongoing support. Client also encouraged to report increased symptoms to their care team and /or go to the nearest Emergency Department and follow their safety plan if needed.     DIMENSION 4/READINESS TO CHANGE:  Admit RR: 2      DC RR: 1  Client is currently in the maintenance stage of change and has been free from chemicals for 9 months. Client has gained; relapse prevention skills, increased sober support, stable housing, and coping skills for triggers.  Client participated and appeared to benefit from all programming. Client will need to continue building support network and working to identify intrinsic reasons for maintaining long term change     DIMENSION 5/RELAPSE & CONTINUED PROBLEM POTENTIAL:  Admit RR: 3   DC   RR: 2  Client has identified the following coping skills to help when they are experiencing cravings; going to a meeting, walks, reading, listening to music, and working in the garage. Client has made significant progress with utilizing these coping skills and is able to provide examples of when they were proactive in stressful situations to minimize the impact of the situation on their craving level. Client will continue to work on implementing these skills consistently in daily life     DIMENSION 6/RECOVERY ENVIRONMENT: Admit RR: 3   DC RR: 2  Client reported he attended weekly support meetings while in treatment.  Client is currently seeking a sponsor but has not yet obtained one. Client is currently living in an environment somewhat conducive to recovery and will be moving into his own home in [facility]). Client is currently on probation and was compliant with all conditions throughout treatment. Client has S/O and adult children and is currently working to rebuild relationships .     STRENGTHS:  Clients strengths include; being hard working and determined. Client also has increased knowledge of the addiction cycle, use patterns, warning signs, and triggers.        PROGNOSIS: Guarded prognosis - Client has completed most of his agreed upon treatment goals and has understanding of his diagnosis. Client continues to show some ambivalence over the idea of never drinking again and his success is highly dependent on his ability to remain connected to sober and supportive resources and his ability to continue to build sober relationships, engage in sober activities and follow recommendations related to ongoing services        LIVING ARRANGEMENTS AT DISCHARGE:   Client has his own home.     CONTINUING CARE RECOMMENDATIONS/REFERRALS:  Client is recommended to attend support meetings weekly, obtain and maintain open communication and regular contact with a sponsor, continue to develop and expand  their support system, and remain abstinent from all mood altering chemicals. Client is recommended to establish and participate in individual therapy and remain compliant with all outside parties          PRAVIN Guerrier

## 2020-03-30 NOTE — PROGRESS NOTES
"INDIVIDUAL SESSION NOTE  Prieto Guzmán  March 30, 2020  [id number]    Phone visit start time:  9:00 AM  Phone visit end time:  9:30 AM    The patient has been notified of the following:     \"We have found that certain health care needs can be provided without the need for a face to face visit.  This service lets us provide the care you need with a phone conversation.      I will have full access to your [facility] medical record during this entire phone call.   I will be taking notes for your medical record.     Since this is like an office visit, we will bill your insurance company for this service.  If your insurance denies the charge we will appeal and/or write off the cost of the service.  The Governor's executive order may result in expanded health insurance coverage for this service, which would be paid by your insurance.         There are potential benefits and risks of telephone visits (e.g. limits to patient confidentiality) that differ from in-person visits.?  Confidentiality still applies for telephone services, and nobody will record the visit.  It is important to be in a quiet, private space that is free of distractions (including cell phone or other devices) during the visit.??     If during the course of the call I believe a telephone visit is not appropriate, you will not be charged for this service\"    Consent has been obtained for this service by care team member: Yes      Data:   Client has been released from [facility]. Client reports he will be moving to [facility] in [facility]. Client reports he has maintained sobriety and his last date of use for alcohol is 6/17/2019. Client reports he is feeling 'great\" and has no immediate health concerns. Client reports no symptoms of depression or anxiety at this time. Client reports he has been walking daily to stay active and looking forward to moving into his new house. Client reports his motivation to maintain sobriety is a 9 out " "of 10. He reports he does not want to \"go back\" to the way things were when he was drinking. Client reports he is compliant with probation    Intervention:   Client and counselor discussed completion of treatment and client reports feeling as though he is \"in a good place\" I informed client I will create discharge instructions including recommendations for continued therapy and aftercare near/in [place], [place] and email these instructions to him at [email or web address]. Client will reply after reviewing and agreeing to instructions. I also let client know I will complete discharge summary and email document to probation    Assessment:  Client appears motivated and cooperative at time of phone call      Plan:   Client will be successfully discharged from [facility] as of today, 3/30/2020    Mallory GAGE, [facility]    "

## 2020-03-30 NOTE — PROGRESS NOTES
[facility] Discharge Summary/Instructions      Patient: Prieto Guzmán  MRN: [id number]  : 1965 Age:55 Sex:M     Focus of Treatment / Discharge Recommendations     Personal Safety/ Management of Symptoms     Follow your safety plan.  Report increased symptoms to your care team and /or go to the nearest Emergency Department.     Call crisis lines as needed     [place] [address]  [phone]  Crisis Line(24 hour) for [facility] [place] Crisis Team, [facility] Staff    [place] Suicide Prevention [phone]       Abstinence/Relapse Prevention  * Take all medicines as directed.  Carry a current list of medicines with you.  * Use coping skills to help manage your daily stressors.   * Do not use illicit (street) drugs, controlled substances (narcotics) or alcohol.     Develop/Improve Independent Living/Socialization Skills: Continue to develop and improve your independent living skills and socialization skills. Ensure you are attending support meetings, communicating with your sponsor, practicing healthy communication skills and remembering your healthy coping strategies when dealing with daily stressors. Ensure you are engaging in self care activities daily. Maintain consistent employment and secure housing both of which are conducive to your recovery. Remain abstinent from all mood altering chemicals.     Community Resources/Supports and Discharge Planning:  Ensure you are attending any and all aftercare recommendations and complying with conditions of your probation. At this time it is strongly recommended you obtain a mental health counselor and engage in individual therapy at least 2 times a month. The following clinic offers such services.  [facility]  [phone]  [phone]  [email or web address] [email or web address] Southern New MexicoSavedPlus IncWebster County Memorial HospitalNipendo  [email or web address]        Client Signature:  Prieto Guzmán Date /Time: 2020         Staff Signature: Mallory  MERARI Velez, [facility]   Date /Time: March 30, 2020  12:19 PM

## 2020-03-30 NOTE — TELEPHONE ENCOUNTER
----- Message from Mallory Velez [facility] sent at 3/30/2020 12:26 PM CDT -----  Please remove this client from groups and individuals. He has completed treatment  Thank You!  Mallory Velez

## 2020-06-16 ENCOUNTER — TELEPHONE (OUTPATIENT)
Dept: FAMILY MEDICINE | Facility: CLINIC | Age: 55
End: 2020-06-16

## 2020-06-16 NOTE — TELEPHONE ENCOUNTER
Panel Management Review      Patient has the following on his problem list:     Diabetes    ASA: Passed    Last A1C  Lab Results   Component Value Date    A1C 7.2 09/30/2019    A1C 7.9 03/07/2019     A1C tested: Passed    Last LDL:    Lab Results   Component Value Date    CHOL 244 03/07/2019     Lab Results   Component Value Date    HDL 48 03/07/2019     Lab Results   Component Value Date     03/07/2019     Lab Results   Component Value Date    TRIG 91 03/07/2019     No results found for: CHOLHDLRATIO  Lab Results   Component Value Date    NHDL 196 03/07/2019       Is the patient on a Statin? YES             Is the patient on Aspirin? YES    Medications     HMG CoA Reductase Inhibitors     atorvastatin (LIPITOR) 20 MG tablet       Salicylates     aspirin (ASA) 81 MG tablet             Last three blood pressure readings:  BP Readings from Last 3 Encounters:   12/27/19 (!) 170/100   09/30/19 (!) 150/80   03/21/19 138/86       Date of last diabetes office visit: 12/27/2019     Tobacco History:     History   Smoking Status     Current Every Day Smoker     Packs/day: 0.00     Types: Cigars, Cigarettes   Smokeless Tobacco     Current User     Types: Snuff     Comment: cigars few times a month from age 16 to age 52.           Hypertension   Last three blood pressure readings:  BP Readings from Last 3 Encounters:   12/27/19 (!) 170/100   09/30/19 (!) 150/80   03/21/19 138/86     Blood pressure: FAILED    HTN Guidelines:  Less than 140/90      Composite cancer screening  Chart review shows that this patient is due/due soon for the following None  Summary:    Patient is due/failing the following:   A1C and BP CHECK    Action needed:   Patient needs office visit for diabetes and hypertension.    Type of outreach:    called patient, invalid number    Questions for provider review:    None                                                                                                                                     Maida GUERRIER MA       Chart routed to  .

## 2021-05-07 ENCOUNTER — IMMUNIZATION (OUTPATIENT)
Dept: FAMILY MEDICINE | Facility: CLINIC | Age: 56
End: 2021-05-07
Payer: COMMERCIAL

## 2021-05-07 PROCEDURE — 0011A PR COVID VAC MODERNA 100 MCG/0.5 ML IM: CPT

## 2021-05-07 PROCEDURE — 91301 PR COVID VAC MODERNA 100 MCG/0.5 ML IM: CPT

## 2021-06-04 ENCOUNTER — IMMUNIZATION (OUTPATIENT)
Dept: FAMILY MEDICINE | Facility: CLINIC | Age: 56
End: 2021-06-04
Attending: FAMILY MEDICINE
Payer: COMMERCIAL

## 2021-06-04 PROCEDURE — 91301 PR COVID VAC MODERNA 100 MCG/0.5 ML IM: CPT

## 2021-06-04 PROCEDURE — 0012A PR COVID VAC MODERNA 100 MCG/0.5 ML IM: CPT
